# Patient Record
Sex: MALE | Race: WHITE | NOT HISPANIC OR LATINO | Employment: FULL TIME | URBAN - METROPOLITAN AREA
[De-identification: names, ages, dates, MRNs, and addresses within clinical notes are randomized per-mention and may not be internally consistent; named-entity substitution may affect disease eponyms.]

---

## 2023-10-04 PROBLEM — R01.1 HEART MURMUR: Status: ACTIVE | Noted: 2023-10-04

## 2023-10-04 PROBLEM — M72.2 PLANTAR FASCIITIS: Status: ACTIVE | Noted: 2023-10-04

## 2023-10-04 PROBLEM — K21.9 ESOPHAGEAL REFLUX: Status: ACTIVE | Noted: 2023-10-04

## 2023-10-04 PROBLEM — D22.5 MELANOCYTIC NEVI OF TRUNK: Status: ACTIVE | Noted: 2023-07-17

## 2023-10-04 PROBLEM — B35.1 TINEA UNGUIUM: Status: ACTIVE | Noted: 2023-07-17

## 2023-10-04 PROBLEM — E78.5 HYPERLIPIDEMIA: Status: ACTIVE | Noted: 2023-10-04

## 2023-10-04 RX ORDER — OMEPRAZOLE 40 MG/1
CAPSULE, DELAYED RELEASE ORAL
COMMUNITY
Start: 2022-11-30 | End: 2023-12-05 | Stop reason: ALTCHOICE

## 2023-10-04 RX ORDER — IBUPROFEN 200 MG
TABLET ORAL EVERY 8 HOURS
COMMUNITY
End: 2023-12-05 | Stop reason: ALTCHOICE

## 2023-10-04 RX ORDER — ACETAMINOPHEN 500 MG
TABLET ORAL EVERY 6 HOURS
COMMUNITY
End: 2023-12-05 | Stop reason: ALTCHOICE

## 2023-10-06 ENCOUNTER — OFFICE VISIT (OUTPATIENT)
Dept: ORTHOPEDIC SURGERY | Facility: CLINIC | Age: 63
End: 2023-10-06
Payer: COMMERCIAL

## 2023-10-06 ENCOUNTER — HOSPITAL ENCOUNTER (OUTPATIENT)
Dept: RADIOLOGY | Facility: CLINIC | Age: 63
Discharge: HOME | End: 2023-10-06
Payer: COMMERCIAL

## 2023-10-06 VITALS — BODY MASS INDEX: 27.44 KG/M2 | HEIGHT: 71 IN | WEIGHT: 196 LBS

## 2023-10-06 DIAGNOSIS — G89.29 CHRONIC PAIN OF LEFT KNEE: Primary | ICD-10-CM

## 2023-10-06 DIAGNOSIS — M23.52 CHRONIC INSTABILITY OF LEFT KNEE: ICD-10-CM

## 2023-10-06 DIAGNOSIS — G89.29 CHRONIC PAIN OF LEFT KNEE: ICD-10-CM

## 2023-10-06 DIAGNOSIS — M25.562 CHRONIC PAIN OF LEFT KNEE: ICD-10-CM

## 2023-10-06 DIAGNOSIS — M17.12 PRIMARY OSTEOARTHRITIS OF LEFT KNEE: ICD-10-CM

## 2023-10-06 DIAGNOSIS — M25.562 CHRONIC PAIN OF LEFT KNEE: Primary | ICD-10-CM

## 2023-10-06 PROCEDURE — 99214 OFFICE O/P EST MOD 30 MIN: CPT | Performed by: ORTHOPAEDIC SURGERY

## 2023-10-06 PROCEDURE — 73560 X-RAY EXAM OF KNEE 1 OR 2: CPT | Mod: LT,FY

## 2023-10-06 PROCEDURE — 1036F TOBACCO NON-USER: CPT | Performed by: ORTHOPAEDIC SURGERY

## 2023-10-06 RX ORDER — NAPROXEN 250 MG/1
250 TABLET ORAL 2 TIMES DAILY PRN
COMMUNITY
End: 2023-12-13 | Stop reason: HOSPADM

## 2023-10-06 RX ORDER — CEFAZOLIN SODIUM 2 G/100ML
2 INJECTION, SOLUTION INTRAVENOUS EVERY 8 HOURS
Status: CANCELLED | OUTPATIENT
Start: 2023-12-12

## 2023-10-06 ASSESSMENT — PAIN SCALES - GENERAL: PAINLEVEL_OUTOF10: 8

## 2023-10-06 ASSESSMENT — PAIN - FUNCTIONAL ASSESSMENT: PAIN_FUNCTIONAL_ASSESSMENT: 0-10

## 2023-10-06 ASSESSMENT — PAIN DESCRIPTION - DESCRIPTORS: DESCRIPTORS: SHARP;THROBBING

## 2023-10-06 NOTE — PROGRESS NOTES
Subjective      Chief Complaint   Patient presents with    Left Knee - Pain        No surgery found     HPI  This 63 year old patient presents today with  left knee pain. The patient states that this left knee pain has been present for years. The patient rates the knee pain as 9. The patient states that knee pain is worse with and activity and bearing weight. The patient states the knee is giving way and locking.The patient has tried ibuprofen and tylenol with no relief. Patient requests a discussion of further treatment options on examination today.     CARDIOLOGY:   Negative for chest pain, shortness of breath.   RESPIRATORY:   Negative for chest pain, shortness of breath.   MUSCULOSKELETAL:   See HPI for details.   NEUROLOGY:   Negative for tingling, numbness, weakness.    JOINT REPLACEMENT HISTORY    Primary joint affected: Left knee     Duration of symptoms: years    Joint replacement related history:  Osteoarthritis Severe  Avascular necrosis: No    Failed nonsurgical treatment:   NSAID/ COXIB: Yes  Weight loss: Yes  Physical therapy: Yes  Intra-articular injection: no  Braces, orthotics, or assistive devices: Yes    Radiological indications for replacement:   Subchondral cyst: No  Subchondral sclerosis: Yes  Periarticular osteophytes: No  Joint subluxation: Yes  Joint space narrowing: Yes    Highest level of walking support:   Cane, wheelchair or walker    Pain History:   Pain at rest: Severe  Pain when weigth bearing: Severe  Pain with passive ROM: Severe  Pain related ADL limitation: Severe  Abnormal findings on physical exam: Severe    Ability to walk without significant pain:  Household ambulator or less than 1 block    Is the patients current pain regimen controlling their joint pain? No     Objective    There were no vitals filed for this visit.    Knee Exam  Constitutional: Appears stated age. No apparent distress  Labored Breathing: No  Psychiatric: Normal mood and effect.   Neurological: alert and  oriented x3  Skin: intact  MUSCULOSKELETAL: Neck: No tenderness. No pain or limitation with range of motion. Back: No tenderness. Straight leg test negative bilaterally. left knee: There is diffuse tenderness over the knee. diminished range of motion from 5-110 degrees. McMurrays is positive. Anterior drawer and lachmans are negative. There is not an effusion present. The knee is not stable to valgus and varus stressing. The patient walks with a painful gait favoring the left knee while walking.    AP and lateral x-rays of the left knee done and read in office today show osteoarthritis of the left knee with loss of joint surface cartilage, sclerosis and spurring. There are anchors present from previous ACL repair.     Lorenzo was seen today for pain.  Diagnoses and all orders for this visit:  Chronic pain of left knee (Primary)  -     XR knee left 1-2 views; Future  Chronic instability of left knee  Primary osteoarthritis of left knee     Options are discussed with the patient in detail. The patient is instructed regarding ice, activity modification and risk for further injury with falling or trauma, and the use of a cane at all times for balance and support and continuing with physician directed at home gentle strengthening and range of motion exercises, and the appropriate use of Tylenol as needed for pain with its potential adverse reactions and side effects. The patient understands. The patient states that this left knee pain is debilitating. The patient states that since this left knee pain has recurred that it is disabling and impairs the patient´s ability to walk and do other activities that are important including participating in activities with family. AP and lateral x-rays show osteoarthritis of the left knee that is worse at the medial compartment with loss of joint surface cartilage, bone on bone and sclerosis. Left total knee replacement with indications, alternatives, potential risks including but not  limited to blood loss, blood clot, nerve damage, infection, death and stroke, benefits, unforseen risks, the rehab involved and the fact that no guarantee can be made were all discussed with the patient in detail. The patient understands, and wishes to proceed with left total knee replacement since his pain is disabling and impairs his ability to do activities that are important to him including exercises which she does to maintain his overall body health I agree. We will be setting this up at a time that is convenient for the patient and as the schedule allows. The patient is to follow up as needed. Please note that this report has been produced using speech recognition software.  It may contain errors related to grammar, punctuation or spelling.  Electronically signed, but not  Dutch Ramirez MD

## 2023-12-05 ENCOUNTER — LAB REQUISITION (OUTPATIENT)
Dept: LAB | Facility: HOSPITAL | Age: 63
End: 2023-12-05
Payer: COMMERCIAL

## 2023-12-05 ENCOUNTER — PRE-ADMISSION TESTING (OUTPATIENT)
Dept: PREADMISSION TESTING | Facility: HOSPITAL | Age: 63
End: 2023-12-05
Payer: COMMERCIAL

## 2023-12-05 VITALS
HEIGHT: 72 IN | TEMPERATURE: 96.6 F | HEART RATE: 54 BPM | DIASTOLIC BLOOD PRESSURE: 84 MMHG | WEIGHT: 197.53 LBS | RESPIRATION RATE: 16 BRPM | BODY MASS INDEX: 26.76 KG/M2 | SYSTOLIC BLOOD PRESSURE: 135 MMHG | OXYGEN SATURATION: 100 %

## 2023-12-05 DIAGNOSIS — M17.12 UNILATERAL PRIMARY OSTEOARTHRITIS, LEFT KNEE: ICD-10-CM

## 2023-12-05 DIAGNOSIS — M17.12 PRIMARY OSTEOARTHRITIS OF LEFT KNEE: ICD-10-CM

## 2023-12-05 DIAGNOSIS — Z01.818 PREOPERATIVE TESTING: Primary | ICD-10-CM

## 2023-12-05 LAB
ABO GROUP (TYPE) IN BLOOD: NORMAL
ANION GAP SERPL CALC-SCNC: 9 MMOL/L
ANTIBODY SCREEN: NORMAL
APPEARANCE UR: CLEAR
APTT PPP: 29.9 SECONDS (ref 22–32.5)
BILIRUB UR STRIP.AUTO-MCNC: NEGATIVE MG/DL
BUN SERPL-MCNC: 22 MG/DL (ref 8–25)
CALCIUM SERPL-MCNC: 9.7 MG/DL (ref 8.5–10.4)
CHLORIDE SERPL-SCNC: 103 MMOL/L (ref 97–107)
CO2 SERPL-SCNC: 27 MMOL/L (ref 24–31)
COLOR UR: NORMAL
CREAT SERPL-MCNC: 1.1 MG/DL (ref 0.4–1.6)
ERYTHROCYTE [DISTWIDTH] IN BLOOD BY AUTOMATED COUNT: 12.4 % (ref 11.5–14.5)
GFR SERPL CREATININE-BSD FRML MDRD: 76 ML/MIN/1.73M*2
GLUCOSE SERPL-MCNC: 93 MG/DL (ref 65–99)
GLUCOSE UR STRIP.AUTO-MCNC: NORMAL MG/DL
HCT VFR BLD AUTO: 40.2 % (ref 41–52)
HGB BLD-MCNC: 13.5 G/DL (ref 13.5–17.5)
INR PPP: 1.1 (ref 0.9–1.2)
KETONES UR STRIP.AUTO-MCNC: NEGATIVE MG/DL
LEUKOCYTE ESTERASE UR QL STRIP.AUTO: NEGATIVE
MCH RBC QN AUTO: 31 PG (ref 26–34)
MCHC RBC AUTO-ENTMCNC: 33.6 G/DL (ref 32–36)
MCV RBC AUTO: 92 FL (ref 80–100)
NITRITE UR QL STRIP.AUTO: NEGATIVE
NRBC BLD-RTO: 0 /100 WBCS (ref 0–0)
PH UR STRIP.AUTO: 5.5 [PH]
PLATELET # BLD AUTO: 216 X10*3/UL (ref 150–450)
POTASSIUM SERPL-SCNC: 4.7 MMOL/L (ref 3.4–5.1)
PROT UR STRIP.AUTO-MCNC: NEGATIVE MG/DL
PROTHROMBIN TIME: 11.4 SECONDS (ref 9.3–12.7)
RBC # BLD AUTO: 4.36 X10*6/UL (ref 4.5–5.9)
RBC # UR STRIP.AUTO: NEGATIVE /UL
RH FACTOR (ANTIGEN D): NORMAL
SODIUM SERPL-SCNC: 139 MMOL/L (ref 133–145)
SP GR UR STRIP.AUTO: 1.03
UROBILINOGEN UR STRIP.AUTO-MCNC: NORMAL MG/DL
WBC # BLD AUTO: 5.2 X10*3/UL (ref 4.4–11.3)

## 2023-12-05 PROCEDURE — 85027 COMPLETE CBC AUTOMATED: CPT

## 2023-12-05 PROCEDURE — 86901 BLOOD TYPING SEROLOGIC RH(D): CPT | Mod: OUT | Performed by: ORTHOPAEDIC SURGERY

## 2023-12-05 PROCEDURE — 93005 ELECTROCARDIOGRAM TRACING: CPT

## 2023-12-05 PROCEDURE — 87081 CULTURE SCREEN ONLY: CPT | Mod: TRILAB

## 2023-12-05 PROCEDURE — 82310 ASSAY OF CALCIUM: CPT

## 2023-12-05 PROCEDURE — 81003 URINALYSIS AUTO W/O SCOPE: CPT

## 2023-12-05 PROCEDURE — 85730 THROMBOPLASTIN TIME PARTIAL: CPT

## 2023-12-05 PROCEDURE — 36415 COLL VENOUS BLD VENIPUNCTURE: CPT

## 2023-12-05 PROCEDURE — 99214 OFFICE O/P EST MOD 30 MIN: CPT | Performed by: NURSE PRACTITIONER

## 2023-12-05 PROCEDURE — 93010 ELECTROCARDIOGRAM REPORT: CPT | Performed by: INTERNAL MEDICINE

## 2023-12-05 RX ORDER — CHLORHEXIDINE GLUCONATE ORAL RINSE 1.2 MG/ML
SOLUTION DENTAL
Qty: 473 ML | Refills: 0 | Status: SHIPPED | OUTPATIENT
Start: 2023-12-05 | End: 2023-12-13 | Stop reason: HOSPADM

## 2023-12-05 ASSESSMENT — ENCOUNTER SYMPTOMS
NEUROLOGICAL NEGATIVE: 1
GASTROINTESTINAL NEGATIVE: 1
RESPIRATORY NEGATIVE: 1
EYES NEGATIVE: 1
PSYCHIATRIC NEGATIVE: 1
ACTIVITY CHANGE: 1
ARTHRALGIAS: 1
ENDOCRINE NEGATIVE: 1
CARDIOVASCULAR NEGATIVE: 1

## 2023-12-05 ASSESSMENT — CHADS2 SCORE
HYPERTENSION: NO
CHF: NO
PRIOR STROKE OR TIA OR THROMBOEMBOLISM: NO
DIABETES: NO
CHADS2 SCORE: 0
AGE GREATER THAN OR EQUAL TO 75: NO

## 2023-12-05 ASSESSMENT — DUKE ACTIVITY SCORE INDEX (DASI)
CAN YOU DO MODERATE WORK AROUND THE HOUSE LIKE VACUUMING, SWEEPING FLOORS OR CARRYING GROCERIES: YES
CAN YOU WALK INDOORS, SUCH AS AROUND YOUR HOUSE: YES
CAN YOU DO HEAVY WORK AROUND THE HOUSE LIKE SCRUBBING FLOORS OR LIFTING AND MOVING HEAVY FURNITURE: YES
CAN YOU PARTICIPATE IN MODERATE RECREATIONAL ACTIVITIES LIKE GOLF, BOWLING, DANCING, DOUBLES TENNIS OR THROWING A BASEBALL OR FOOTBALL: YES
CAN YOU TAKE CARE OF YOURSELF (EAT, DRESS, BATHE, OR USE TOILET): YES
CAN YOU DO YARD WORK LIKE RAKING LEAVES, WEEDING OR PUSHING A MOWER: YES
CAN YOU DO LIGHT WORK AROUND THE HOUSE LIKE DUSTING OR WASHING DISHES: YES
CAN YOU RUN A SHORT DISTANCE: NO
DASI METS SCORE: 8
TOTAL_SCORE: 42.7
CAN YOU PARTICIPATE IN STRENOUS SPORTS LIKE SWIMMING, SINGLES TENNIS, FOOTBALL, BASKETBALL, OR SKIING: NO
CAN YOU CLIMB A FLIGHT OF STAIRS OR WALK UP A HILL: YES
CAN YOU WALK A BLOCK OR TWO ON LEVEL GROUND: YES
CAN YOU HAVE SEXUAL RELATIONS: YES

## 2023-12-05 ASSESSMENT — PAIN SCALES - GENERAL: PAINLEVEL_OUTOF10: 1

## 2023-12-05 ASSESSMENT — PAIN - FUNCTIONAL ASSESSMENT: PAIN_FUNCTIONAL_ASSESSMENT: 0-10

## 2023-12-05 ASSESSMENT — PAIN DESCRIPTION - DESCRIPTORS: DESCRIPTORS: ACHING

## 2023-12-05 NOTE — CPM/PAT H&P
CPM/PAT Evaluation       Name: Lornezo Peraza (Lorenzo Peraza)  /Age: 1960/63 y.o.     In-Person       Chief Complaint: Primary osteoarthritis of left knee    HPI  A 63-year-old male with primary osteoarthritis of the left knee.  History of progressive left knee pain over the past year that has become more severe in the last few months. Symptoms increase with ambulation, stairs, and transitioning from sitting to standing interfering with sleep and ADLs. Conservative treatments not helping.  He underwent successful right total knee arthroplasty 2022.  Endorses some radiating pain down left lower extremity. Denies chest pain, shortness of breath, syncope, history of MI, CVA, PE, DVT, fever, chills, or left lower extremity numbness.  He is scheduled for left total knee arthroplasty.    Past Medical History:   Diagnosis Date    Body mass index (BMI) 26.0-26.9, adult 2021    BMI 26.0-26.9,adult    Heart murmur     since age 16 no intervention    Hyperlipidemia     Osteoarthritis        Past Surgical History:   Procedure Laterality Date    COLONOSCOPY      x 2    CT HEAD ANGIO W AND WO IV CONTRAST  2021    CT HEAD ANGIO W AND WO IV CONTRAST 2021 [facility]    KNEE  2017    KNEE Bilateral     scope    KNEE ARTHROPLASTY Right 2023    KNEE SURGERY  2017    Knee Surgery    TONSILLECTOMY           No Known Allergies    Current Outpatient Medications   Medication Sig Dispense Refill    chlorhexidine (Peridex) 0.12 % solution Use cap to measure 15 mL.  Swish/gargle mouthwash for at least 30 seconds.  Do not swallow.  Use night before surgery after brushing teeth and morning of surgery after brushing teeth. 473 mL 0    naproxen (Naprosyn) 250 mg tablet Take 1 tablet (250 mg) by mouth 2 times a day as needed for mild pain (1 - 3).       No current facility-administered medications for this visit.     Review of Systems   Constitutional:  Positive for activity change.   HENT:  Negative.     Eyes: Negative.    Respiratory: Negative.     Cardiovascular: Negative.         H/o heart murmur diagnosed age 16   Gastrointestinal: Negative.    Endocrine: Negative.    Genitourinary: Negative.    Musculoskeletal:  Positive for arthralgias.        Left knee pain   Skin: Negative.    Neurological: Negative.    Psychiatric/Behavioral: Negative.          Physical Exam  Vitals reviewed.   HENT:      Head: Normocephalic and atraumatic.      Mouth/Throat:      Mouth: Mucous membranes are moist.   Eyes:      Pupils: Pupils are equal, round, and reactive to light.   Cardiovascular:      Rate and Rhythm: Normal rate and regular rhythm.      Heart sounds: Murmur (soft grade I-II/IV) heard.   Pulmonary:      Effort: Pulmonary effort is normal.      Breath sounds: Normal breath sounds.   Abdominal:      Palpations: Abdomen is soft.   Musculoskeletal:         General: Normal range of motion.      Cervical back: Normal range of motion.      Comments: Left knee pain, antalgic gait   Skin:     General: Skin is warm and dry.   Neurological:      Mental Status: He is alert and oriented to person, place, and time.   Psychiatric:         Mood and Affect: Mood normal.          PAT AIRWAY:   Airway:     Mallampati::  I    Neck ROM::  Full  normal        /84   Pulse 54   Temp 35.9 °C (96.6 °F) (Temporal)   Resp 16   Ht 1.829 m (6')   Wt 89.6 kg (197 lb 8.5 oz)   SpO2 100%   BMI 26.79 kg/m²         Stop Bang Score 2     CHADS 2 score: 1.9%  DASI score: 42.7  METS score: 8  Revised cardiac risk index: 0.9%  ASA II  ARISCAT 1.6%    Assessment and Plan:     Primary osteoarthritis of left knee Plan: Left total knee arthroplasty.  Hyperlipidemia  GERD  History of heart murmur since age 16-no interventions

## 2023-12-05 NOTE — PREPROCEDURE INSTRUCTIONS
Medication List            Accurate as of December 5, 2023 12:33 PM. Always use your most recent med list.                naproxen 250 mg tablet  Commonly known as: Naprosyn  Medication Adjustments for Surgery: Stop 7 days before surgery                 PAT DISCHARGE INSTRUCTIONS    Please call the Same Day Surgery (SDS) Department of the hospital where your procedure will be performed after 2:00 PM the day before your surgery. If you are scheduled on a Monday, or a Tuesday following a Monday holiday, you will need to call on the last business day prior to your surgery.    [facility]  [address] [place] [address], [address] [phone]    [facility]  [address] [place] [address] [phone]    [facility]  [address] [address]  [place] [id number] [phone]    Please let your surgeon know if:      You develop any open sores, shingles, burning or painful urination as these may increase your risk of an infection.   You no longer wish to have the surgery.   Any other personal circumstances change that may lead to the need to cancel or defer this surgery-such as being sick or getting admitted to any hospital within one week of your planned procedure.    Your contact details change, such as a change of address or phone number.    Starting now:     Please DO NOT drink alcohol or smoke for 24 hours before surgery. It is well known that quitting smoking can make a huge difference to your health and recovery from surgery. The longer you abstain from smoking, the better your chances of a healthy recovery. If you need help with quitting, call 5-800QUIT-NOW to be connected to a trained counselor who will discuss the best methods to help you quit.     Before your surgery:    Please stop all supplements 7 days prior to surgery. Or as directed by your surgeon.   Please stop taking NSAID pain medicine such as Advil and Motrin 7 days before surgery.    If you develop any  fever, cough, cold, rashes, cuts, scratches, scrapes, urinary symptoms or infection anywhere on your body (including teeth and gums) prior to surgery, please call your surgeon’s office as soon as possible. This may require treatment to reduce the chance of cancellation on the day of surgery.    The day before your surgery:   DIET- Do not eat any food after MIDNIGHT. May have 10 ounces of CLEAR LIQUIDS until TWO HOURS before your arrival time. This includes water, black tea or coffee (no milk ir cream), apple juice and electrolyte drinks (Gatorade). May chew gum until TWO hours before your surgery time.   Get a good night’s rest.  Use the special soap for bathing if you have been instructed to use one.    Scheduled surgery times may change and you will be notified if this occurs - please check your personal voicemail for any updates.     On the morning of surgery:   Wear comfortable, loose fitting clothes which open in the front. Please do not wear moisturizers, creams, lotions, makeup or perfume.    Please bring with you to surgery:   Photo ID and insurance card   Current list of medicines and allergies   Pacemaker/ Defibrillator/Heart stent cards   CPAP machine and mask    Slings/ splints/ crutches   A copy of your complete advanced directive/DHPOA.    Please do NOT bring with you to surgery:   All jewelry and valuables should be left at home.   Prosthetic devices such as contact lenses, hearing aids, dentures, eyelash extensions, hairpins and body piercings must be removed prior to going in to the surgical suite.    After outpatient surgery:   A responsible adult MUST accompany you at the time of discharge and stay with you for 24 hours after your surgery. You may NOT drive yourself home after surgery.    Do not drive, operate machinery, make critical decisions or do activities that require co-ordination or balance until after a night’s sleep.   Do not drink alcoholic beverages for 24 hours.   Instructions for  resuming your medications will be provided by your surgeon.    CALL YOUR DOCTOR AFTER SURGERY IF YOU HAVE:     Chills and/or a fever of 101° F or higher.    Redness, swelling, pus or drainage from your surgical wound or a bad smell from the wound.    Lightheadedness, fainting or confusion.    Persistent vomiting (throwing up) and are not able to eat or drink for 12 hours.    Three or more loose, watery bowel movements in 24 hours (diarrhea).   Difficulty or pain while urinating( after non-urological surgery)    Pain and swelling in your legs, especially if it is only on one side.    Difficulty breathing or are breathing faster than normal.    Any new concerning symptoms.     Home Preoperative Antibacterial Shower    What is a home preoperative antibacterial shower?  This shower is a way of cleaning the skin with a germ killing solution before surgery. The solution contains chlorhexidine, commonly known as CHG. CHG is a skin cleanser with germ killing ability. Let your doctor know if you are allergic to chlorhexidine.      Why do I need to take a preoperative antibacterial shower?  Skin is not sterile. It is best to try to make your skin as free of germs as possible before surgery. Proper cleansing with a germ killing soap before surgery can lower the number of germs on your skin. This helps to reduce the risk of infection at the surgical site. Following the instructions listed below will help you prepare your skin for surgery.    How do I use the solution?      Steps: Begin using your CHG soap FIVE DAYS BEFORE your scheduled surgery on __________________________________________________.  First, wash and rinse your hair using the CHG soap. Keep CHG away soap away from ear canals and eyes.  Rinse completely, do not condition. Hair extensions should be removed.  Wash your face with your normal soap and rinse.  Apply the CHG solution to a clean wet washcloth. Turn the water off or move away from the water spray to avoid  premature rinsing of the CHG soap as you are applying.  Firmly lather your entire body from neck down. Do not use on face.  Pay special attention to the areas(s) where your incision(s) will be located unless they are on your face.  Avoid scrubbing your skin too hard.  The important point is to have the CHG soap sit on your skin for 3 minutes.  DO NOT wash with regular soap after you have used the CHG soap solution.  Pat yourself dry with a clean, freshly laundered towel.  DO NOT apply powders, deodorants or lotions.  Dress in clean, freshly laundered night clothes.  Be sure to sleep with clean, freshly laundered sheets.  Be aware that CHG will cause stains on fabrics; if you wash them with bleach after use. Rinse your washcloth and other linens that have contact with CHG completely. Use only non-chlorine detergents to launder the items used.  The morning of surgery is the fifth day. Repeat the above steps and dress in clean comfortable clothing.      Who should I call if I have any questions regarding the use of CHG soap?  Call the [facility] at [phone] if you have any questions.     Patient Information: Oral/Dental Rinse    What is oral/dental rinse?  It is a mouthwash. It is a way of cleaning the mouth with a germ killing solution before your surgery. The solution contains chlorhexidine, commonly know as CHG.  It is used inside the mouth to kill bacteria known as Staphylococcus aureus.  Let your doctor know if you are allergic to chlorhexidine.    Why do I need to use CHG oral/dental rinse?  The CHG oral/dental rinse helps to kill bacteria in your mouth known as Staphylococcus aureus. This reduces the risk of infection at the surgical site.    Using your CHG oral/dental rinse.  STEPS: use your CHG oral/dental rinse after you brush your teeth the night before (at bedtime) and the morning of your surgery. Follow the directions on your  prescription label.  Use the cap on the container to measure 15ml (fill cap to fill line)  Swish (gargle if you can) the mouthwash in your mouth for at least 30 seconds, (do not swallow) spit out.  After you use your CHG rinse, do not rinse your mouth with water, drink or eat. Please refer to prescription label for the appropriate time to resume oral intake.    What side effects might I have using the CHG oral/dental rinse?  CHG rinse will stick to the plaque on the teeth. Brush and floss just before use. Teeth brushing will help avoid staining of plaque during use.    Who should I contact if I have questions about the CHG oral/dental rinse?  Please call [facility], [facility] at [phone] if you have any questions.

## 2023-12-05 NOTE — H&P (VIEW-ONLY)
CPM/PAT Evaluation       Name: Lorenzo Peraza (Lorenzo Peraza)  /Age: 1960/63 y.o.     In-Person       Chief Complaint: Primary osteoarthritis of left knee    HPI  A 63-year-old male with primary osteoarthritis of the left knee.  History of progressive left knee pain over the past year that has become more severe in the last few months. Symptoms increase with ambulation, stairs, and transitioning from sitting to standing interfering with sleep and ADLs. Conservative treatments not helping.  He underwent successful right total knee arthroplasty 2022.  Endorses some radiating pain down left lower extremity. Denies chest pain, shortness of breath, syncope, history of MI, CVA, PE, DVT, fever, chills, or left lower extremity numbness.  He is scheduled for left total knee arthroplasty.    Past Medical History:   Diagnosis Date    Body mass index (BMI) 26.0-26.9, adult 2021    BMI 26.0-26.9,adult    Heart murmur     since age 16 no intervention    Hyperlipidemia     Osteoarthritis        Past Surgical History:   Procedure Laterality Date    COLONOSCOPY      x 2    CT HEAD ANGIO W AND WO IV CONTRAST  2021    CT HEAD ANGIO W AND WO IV CONTRAST 2021 [facility]    KNEE  2017    KNEE Bilateral     scope    KNEE ARTHROPLASTY Right 2023    KNEE SURGERY  2017    Knee Surgery    TONSILLECTOMY           No Known Allergies    Current Outpatient Medications   Medication Sig Dispense Refill    chlorhexidine (Peridex) 0.12 % solution Use cap to measure 15 mL.  Swish/gargle mouthwash for at least 30 seconds.  Do not swallow.  Use night before surgery after brushing teeth and morning of surgery after brushing teeth. 473 mL 0    naproxen (Naprosyn) 250 mg tablet Take 1 tablet (250 mg) by mouth 2 times a day as needed for mild pain (1 - 3).       No current facility-administered medications for this visit.     Review of Systems   Constitutional:  Positive for activity change.   HENT:  Negative.     Eyes: Negative.    Respiratory: Negative.     Cardiovascular: Negative.         H/o heart murmur diagnosed age 16   Gastrointestinal: Negative.    Endocrine: Negative.    Genitourinary: Negative.    Musculoskeletal:  Positive for arthralgias.        Left knee pain   Skin: Negative.    Neurological: Negative.    Psychiatric/Behavioral: Negative.          Physical Exam  Vitals reviewed.   HENT:      Head: Normocephalic and atraumatic.      Mouth/Throat:      Mouth: Mucous membranes are moist.   Eyes:      Pupils: Pupils are equal, round, and reactive to light.   Cardiovascular:      Rate and Rhythm: Normal rate and regular rhythm.      Heart sounds: Murmur (soft grade I-II/IV) heard.   Pulmonary:      Effort: Pulmonary effort is normal.      Breath sounds: Normal breath sounds.   Abdominal:      Palpations: Abdomen is soft.   Musculoskeletal:         General: Normal range of motion.      Cervical back: Normal range of motion.      Comments: Left knee pain, antalgic gait   Skin:     General: Skin is warm and dry.   Neurological:      Mental Status: He is alert and oriented to person, place, and time.   Psychiatric:         Mood and Affect: Mood normal.          PAT AIRWAY:   Airway:     Mallampati::  I    Neck ROM::  Full  normal        /84   Pulse 54   Temp 35.9 °C (96.6 °F) (Temporal)   Resp 16   Ht 1.829 m (6')   Wt 89.6 kg (197 lb 8.5 oz)   SpO2 100%   BMI 26.79 kg/m²         Stop Bang Score 2     CHADS 2 score: 1.9%  DASI score: 42.7  METS score: 8  Revised cardiac risk index: 0.9%  ASA II  ARISCAT 1.6%    Assessment and Plan:     Primary osteoarthritis of left knee Plan: Left total knee arthroplasty.  Hyperlipidemia  GERD  History of heart murmur since age 16-no interventions

## 2023-12-06 LAB
ATRIAL RATE: 51 BPM
P AXIS: 75 DEGREES
P OFFSET: 181 MS
P ONSET: 116 MS
PR INTERVAL: 218 MS
Q ONSET: 225 MS
QRS COUNT: 9 BEATS
QRS DURATION: 78 MS
QT INTERVAL: 444 MS
QTC CALCULATION(BAZETT): 409 MS
QTC FREDERICIA: 421 MS
R AXIS: 5 DEGREES
T AXIS: 29 DEGREES
T OFFSET: 447 MS
VENTRICULAR RATE: 51 BPM

## 2023-12-07 LAB — STAPHYLOCOCCUS SPEC CULT: ABNORMAL

## 2023-12-12 ENCOUNTER — HOSPITAL ENCOUNTER (OUTPATIENT)
Facility: HOSPITAL | Age: 63
Setting detail: OBSERVATION
Discharge: HOME | End: 2023-12-13
Attending: ORTHOPAEDIC SURGERY | Admitting: ORTHOPAEDIC SURGERY
Payer: COMMERCIAL

## 2023-12-12 ENCOUNTER — ANESTHESIA EVENT (OUTPATIENT)
Dept: OPERATING ROOM | Facility: HOSPITAL | Age: 63
End: 2023-12-12
Payer: COMMERCIAL

## 2023-12-12 ENCOUNTER — ANESTHESIA (OUTPATIENT)
Dept: OPERATING ROOM | Facility: HOSPITAL | Age: 63
End: 2023-12-12
Payer: COMMERCIAL

## 2023-12-12 ENCOUNTER — APPOINTMENT (OUTPATIENT)
Dept: RADIOLOGY | Facility: HOSPITAL | Age: 63
End: 2023-12-12
Payer: COMMERCIAL

## 2023-12-12 DIAGNOSIS — M17.12 PRIMARY OSTEOARTHRITIS OF LEFT KNEE: ICD-10-CM

## 2023-12-12 PROBLEM — Z98.890 HISTORY OF GENERAL ANESTHESIA: Status: ACTIVE | Noted: 2023-12-12

## 2023-12-12 LAB
ABO GROUP (TYPE) IN BLOOD: NORMAL
RH FACTOR (ANTIGEN D): NORMAL

## 2023-12-12 PROCEDURE — 64447 NJX AA&/STRD FEMORAL NRV IMG: CPT | Performed by: ANESTHESIOLOGY

## 2023-12-12 PROCEDURE — A27447 PR TOTAL KNEE ARTHROPLASTY: Performed by: NURSE ANESTHETIST, CERTIFIED REGISTERED

## 2023-12-12 PROCEDURE — A4649 SURGICAL SUPPLIES: HCPCS | Performed by: ORTHOPAEDIC SURGERY

## 2023-12-12 PROCEDURE — 3700000001 HC GENERAL ANESTHESIA TIME - INITIAL BASE CHARGE: Performed by: ORTHOPAEDIC SURGERY

## 2023-12-12 PROCEDURE — 3700000002 HC GENERAL ANESTHESIA TIME - EACH INCREMENTAL 1 MINUTE: Performed by: ORTHOPAEDIC SURGERY

## 2023-12-12 PROCEDURE — G0378 HOSPITAL OBSERVATION PER HR: HCPCS

## 2023-12-12 PROCEDURE — 2500000004 HC RX 250 GENERAL PHARMACY W/ HCPCS (ALT 636 FOR OP/ED): Performed by: ANESTHESIOLOGY

## 2023-12-12 PROCEDURE — C1776 JOINT DEVICE (IMPLANTABLE): HCPCS | Performed by: ORTHOPAEDIC SURGERY

## 2023-12-12 PROCEDURE — 96365 THER/PROPH/DIAG IV INF INIT: CPT | Mod: 59 | Performed by: ORTHOPAEDIC SURGERY

## 2023-12-12 PROCEDURE — A27447 PR TOTAL KNEE ARTHROPLASTY: Performed by: ANESTHESIOLOGY

## 2023-12-12 PROCEDURE — 2500000005 HC RX 250 GENERAL PHARMACY W/O HCPCS: Performed by: NURSE ANESTHETIST, CERTIFIED REGISTERED

## 2023-12-12 PROCEDURE — 7100000001 HC RECOVERY ROOM TIME - INITIAL BASE CHARGE: Performed by: ORTHOPAEDIC SURGERY

## 2023-12-12 PROCEDURE — 36415 COLL VENOUS BLD VENIPUNCTURE: CPT | Performed by: ORTHOPAEDIC SURGERY

## 2023-12-12 PROCEDURE — 2780000003 HC OR 278 NO HCPCS: Performed by: ORTHOPAEDIC SURGERY

## 2023-12-12 PROCEDURE — 73560 X-RAY EXAM OF KNEE 1 OR 2: CPT | Mod: LT,FY

## 2023-12-12 PROCEDURE — 2500000001 HC RX 250 WO HCPCS SELF ADMINISTERED DRUGS (ALT 637 FOR MEDICARE OP): Performed by: ORTHOPAEDIC SURGERY

## 2023-12-12 PROCEDURE — 2500000004 HC RX 250 GENERAL PHARMACY W/ HCPCS (ALT 636 FOR OP/ED): Performed by: ORTHOPAEDIC SURGERY

## 2023-12-12 PROCEDURE — 2500000004 HC RX 250 GENERAL PHARMACY W/ HCPCS (ALT 636 FOR OP/ED): Performed by: NURSE ANESTHETIST, CERTIFIED REGISTERED

## 2023-12-12 PROCEDURE — 27447 TOTAL KNEE ARTHROPLASTY: CPT | Performed by: PHYSICIAN ASSISTANT

## 2023-12-12 PROCEDURE — 2720000007 HC OR 272 NO HCPCS: Performed by: ORTHOPAEDIC SURGERY

## 2023-12-12 PROCEDURE — 27447 TOTAL KNEE ARTHROPLASTY: CPT | Performed by: ORTHOPAEDIC SURGERY

## 2023-12-12 PROCEDURE — 3600000018 HC OR TIME - INITIAL BASE CHARGE - PROCEDURE LEVEL SIX: Performed by: ORTHOPAEDIC SURGERY

## 2023-12-12 PROCEDURE — 3600000017 HC OR TIME - EACH INCREMENTAL 1 MINUTE - PROCEDURE LEVEL SIX: Performed by: ORTHOPAEDIC SURGERY

## 2023-12-12 PROCEDURE — 7100000002 HC RECOVERY ROOM TIME - EACH INCREMENTAL 1 MINUTE: Performed by: ORTHOPAEDIC SURGERY

## 2023-12-12 DEVICE — ATTUNE KNEE SYSTEM TIBIAL INSERT FIXED BEARING POSTERIOR STABILIZED 7 7MM AOX
Type: IMPLANTABLE DEVICE | Site: KNEE | Status: FUNCTIONAL
Brand: ATTUNE

## 2023-12-12 DEVICE — ATTUNE PATELLA MEDIALIZED DOME 41MM CEMENTED AOX
Type: IMPLANTABLE DEVICE | Site: KNEE | Status: FUNCTIONAL
Brand: ATTUNE

## 2023-12-12 DEVICE — SIMPLEX® HV IS A FAST-SETTING ACRYLIC RESIN FOR USE IN BONE SURGERY. MIXING THE TWO SEPARATE STERILE COMPONENTS PRODUCES A DUCTILE BONE CEMENT WHICH, AFTER HARDENING, FIXES THE IMPLANT AND TRANSFERS STRESSES PRODUCED DURING MOVEMENT EVENLY TO THE BONE. SIMPLEX® HV CEMENT POWDER ALSO CONTAINS INSOLUBLE ZIRCONIUM DIOXIDE AS AN X-RAY CONTRAST MEDIUM. SIMPLEX® HV DOES NOT EMIT A SIGNAL AND DOES NOT POSE A SAFETY RISK IN A MAGNETIC RESONANCE ENVIRONMENT.
Type: IMPLANTABLE DEVICE | Site: KNEE | Status: FUNCTIONAL
Brand: SIMPLEX HV

## 2023-12-12 DEVICE — ATTUNE KNEE SYSTEM TIBIAL BASE FIXED BEARING SIZE 8 CEMENTED
Type: IMPLANTABLE DEVICE | Site: KNEE | Status: FUNCTIONAL
Brand: ATTUNE

## 2023-12-12 DEVICE — ATTUNE KNEE SYSTEM FEMORAL POSTERIOR STABILIZED SIZE 7 LEFT CEMENTED
Type: IMPLANTABLE DEVICE | Site: KNEE | Status: FUNCTIONAL
Brand: ATTUNE

## 2023-12-12 RX ORDER — FENTANYL CITRATE 50 UG/ML
50 INJECTION, SOLUTION INTRAMUSCULAR; INTRAVENOUS ONCE
Status: COMPLETED | OUTPATIENT
Start: 2023-12-12 | End: 2023-12-12

## 2023-12-12 RX ORDER — MIDAZOLAM HYDROCHLORIDE 1 MG/ML
2 INJECTION, SOLUTION INTRAMUSCULAR; INTRAVENOUS ONCE
Status: COMPLETED | OUTPATIENT
Start: 2023-12-12 | End: 2023-12-12

## 2023-12-12 RX ORDER — HYDRALAZINE HYDROCHLORIDE 20 MG/ML
5 INJECTION INTRAMUSCULAR; INTRAVENOUS EVERY 30 MIN PRN
Status: DISCONTINUED | OUTPATIENT
Start: 2023-12-12 | End: 2023-12-12 | Stop reason: HOSPADM

## 2023-12-12 RX ORDER — LIDOCAINE HYDROCHLORIDE 20 MG/ML
INJECTION, SOLUTION INFILTRATION; PERINEURAL AS NEEDED
Status: DISCONTINUED | OUTPATIENT
Start: 2023-12-12 | End: 2023-12-12

## 2023-12-12 RX ORDER — TRANEXAMIC ACID 100 MG/ML
INJECTION, SOLUTION INTRAVENOUS AS NEEDED
Status: DISCONTINUED | OUTPATIENT
Start: 2023-12-12 | End: 2023-12-12

## 2023-12-12 RX ORDER — ONDANSETRON HYDROCHLORIDE 2 MG/ML
INJECTION, SOLUTION INTRAVENOUS AS NEEDED
Status: DISCONTINUED | OUTPATIENT
Start: 2023-12-12 | End: 2023-12-12

## 2023-12-12 RX ORDER — CEFAZOLIN SODIUM 2 G/100ML
2 INJECTION, SOLUTION INTRAVENOUS EVERY 8 HOURS
Status: COMPLETED | OUTPATIENT
Start: 2023-12-12 | End: 2023-12-13

## 2023-12-12 RX ORDER — ACETAMINOPHEN 325 MG/1
650 TABLET ORAL EVERY 6 HOURS SCHEDULED
Status: DISCONTINUED | OUTPATIENT
Start: 2023-12-12 | End: 2023-12-13 | Stop reason: HOSPADM

## 2023-12-12 RX ORDER — OXYCODONE HYDROCHLORIDE 5 MG/1
5 TABLET ORAL EVERY 4 HOURS PRN
Status: DISCONTINUED | OUTPATIENT
Start: 2023-12-12 | End: 2023-12-12 | Stop reason: HOSPADM

## 2023-12-12 RX ORDER — ACETAMINOPHEN 325 MG/1
650 TABLET ORAL EVERY 4 HOURS PRN
Status: DISCONTINUED | OUTPATIENT
Start: 2023-12-12 | End: 2023-12-13 | Stop reason: HOSPADM

## 2023-12-12 RX ORDER — MORPHINE SULFATE 2 MG/ML
1 INJECTION, SOLUTION INTRAMUSCULAR; INTRAVENOUS EVERY 4 HOURS PRN
Status: DISCONTINUED | OUTPATIENT
Start: 2023-12-12 | End: 2023-12-13 | Stop reason: HOSPADM

## 2023-12-12 RX ORDER — FENTANYL CITRATE 50 UG/ML
25 INJECTION, SOLUTION INTRAMUSCULAR; INTRAVENOUS EVERY 5 MIN PRN
Status: DISCONTINUED | OUTPATIENT
Start: 2023-12-12 | End: 2023-12-12 | Stop reason: HOSPADM

## 2023-12-12 RX ORDER — CEFAZOLIN SODIUM 2 G/100ML
2 INJECTION, SOLUTION INTRAVENOUS EVERY 8 HOURS
Status: DISCONTINUED | OUTPATIENT
Start: 2023-12-12 | End: 2023-12-12 | Stop reason: HOSPADM

## 2023-12-12 RX ORDER — ONDANSETRON HYDROCHLORIDE 2 MG/ML
4 INJECTION, SOLUTION INTRAVENOUS ONCE AS NEEDED
Status: DISCONTINUED | OUTPATIENT
Start: 2023-12-12 | End: 2023-12-12 | Stop reason: HOSPADM

## 2023-12-12 RX ORDER — ONDANSETRON HYDROCHLORIDE 2 MG/ML
4 INJECTION, SOLUTION INTRAVENOUS EVERY 8 HOURS PRN
Status: DISCONTINUED | OUTPATIENT
Start: 2023-12-12 | End: 2023-12-13 | Stop reason: HOSPADM

## 2023-12-12 RX ORDER — FERROUS SULFATE 325(65) MG
2 TABLET ORAL
Status: DISCONTINUED | OUTPATIENT
Start: 2023-12-13 | End: 2023-12-13 | Stop reason: HOSPADM

## 2023-12-12 RX ORDER — ONDANSETRON 4 MG/1
4 TABLET, FILM COATED ORAL EVERY 8 HOURS PRN
Status: DISCONTINUED | OUTPATIENT
Start: 2023-12-12 | End: 2023-12-13 | Stop reason: HOSPADM

## 2023-12-12 RX ORDER — PROPOFOL 10 MG/ML
INJECTION, EMULSION INTRAVENOUS AS NEEDED
Status: DISCONTINUED | OUTPATIENT
Start: 2023-12-12 | End: 2023-12-12

## 2023-12-12 RX ORDER — HYDROCODONE BITARTRATE AND ACETAMINOPHEN 5; 325 MG/1; MG/1
1 TABLET ORAL EVERY 4 HOURS PRN
Status: DISCONTINUED | OUTPATIENT
Start: 2023-12-12 | End: 2023-12-13 | Stop reason: HOSPADM

## 2023-12-12 RX ORDER — MIDAZOLAM HYDROCHLORIDE 1 MG/ML
INJECTION, SOLUTION INTRAMUSCULAR; INTRAVENOUS AS NEEDED
Status: DISCONTINUED | OUTPATIENT
Start: 2023-12-12 | End: 2023-12-12

## 2023-12-12 RX ORDER — VANCOMYCIN HYDROCHLORIDE 1 G/20ML
INJECTION, POWDER, LYOPHILIZED, FOR SOLUTION INTRAVENOUS AS NEEDED
Status: DISCONTINUED | OUTPATIENT
Start: 2023-12-12 | End: 2023-12-12 | Stop reason: HOSPADM

## 2023-12-12 RX ORDER — ALBUTEROL SULFATE 0.83 MG/ML
2.5 SOLUTION RESPIRATORY (INHALATION) ONCE AS NEEDED
Status: DISCONTINUED | OUTPATIENT
Start: 2023-12-12 | End: 2023-12-12 | Stop reason: HOSPADM

## 2023-12-12 RX ORDER — POLYETHYLENE GLYCOL 3350 17 G/17G
17 POWDER, FOR SOLUTION ORAL DAILY
Status: DISCONTINUED | OUTPATIENT
Start: 2023-12-12 | End: 2023-12-13 | Stop reason: HOSPADM

## 2023-12-12 RX ORDER — DIPHENHYDRAMINE HYDROCHLORIDE 50 MG/ML
12.5 INJECTION INTRAMUSCULAR; INTRAVENOUS ONCE AS NEEDED
Status: DISCONTINUED | OUTPATIENT
Start: 2023-12-12 | End: 2023-12-12 | Stop reason: HOSPADM

## 2023-12-12 RX ORDER — IPRATROPIUM BROMIDE 0.5 MG/2.5ML
500 SOLUTION RESPIRATORY (INHALATION) ONCE
Status: DISCONTINUED | OUTPATIENT
Start: 2023-12-12 | End: 2023-12-12 | Stop reason: HOSPADM

## 2023-12-12 RX ORDER — DEXAMETHASONE SODIUM PHOSPHATE 4 MG/ML
INJECTION, SOLUTION INTRA-ARTICULAR; INTRALESIONAL; INTRAMUSCULAR; INTRAVENOUS; SOFT TISSUE AS NEEDED
Status: DISCONTINUED | OUTPATIENT
Start: 2023-12-12 | End: 2023-12-12

## 2023-12-12 RX ADMIN — CEFAZOLIN SODIUM 2 G: 2 INJECTION, SOLUTION INTRAVENOUS at 23:24

## 2023-12-12 RX ADMIN — DEXAMETHASONE SODIUM PHOSPHATE 4 MG: 4 INJECTION, SOLUTION INTRA-ARTICULAR; INTRALESIONAL; INTRAMUSCULAR; INTRAVENOUS; SOFT TISSUE at 08:14

## 2023-12-12 RX ADMIN — ACETAMINOPHEN 650 MG: 325 TABLET ORAL at 19:41

## 2023-12-12 RX ADMIN — ACETAMINOPHEN 650 MG: 325 TABLET ORAL at 23:25

## 2023-12-12 RX ADMIN — PROPOFOL 200 MG: 10 INJECTION, EMULSION INTRAVENOUS at 07:55

## 2023-12-12 RX ADMIN — HYDROMORPHONE HYDROCHLORIDE 0.2 MG: 0.2 INJECTION, SOLUTION INTRAMUSCULAR; INTRAVENOUS; SUBCUTANEOUS at 11:15

## 2023-12-12 RX ADMIN — MIDAZOLAM 2 MG: 1 INJECTION INTRAMUSCULAR; INTRAVENOUS at 07:57

## 2023-12-12 RX ADMIN — FENTANYL CITRATE 50 MCG: 0.05 INJECTION, SOLUTION INTRAMUSCULAR; INTRAVENOUS at 07:22

## 2023-12-12 RX ADMIN — HYDROMORPHONE HYDROCHLORIDE 0.2 MG: 0.2 INJECTION, SOLUTION INTRAMUSCULAR; INTRAVENOUS; SUBCUTANEOUS at 11:22

## 2023-12-12 RX ADMIN — MEPERIDINE HYDROCHLORIDE 12.5 MG: 25 INJECTION, SOLUTION INTRAMUSCULAR; INTRAVENOUS; SUBCUTANEOUS at 10:54

## 2023-12-12 RX ADMIN — HYDROMORPHONE HYDROCHLORIDE 0.2 MG: 0.2 INJECTION, SOLUTION INTRAMUSCULAR; INTRAVENOUS; SUBCUTANEOUS at 11:02

## 2023-12-12 RX ADMIN — CEFAZOLIN SODIUM 2 G: 2 INJECTION, SOLUTION INTRAVENOUS at 15:30

## 2023-12-12 RX ADMIN — HYDROCODONE BITARTRATE AND ACETAMINOPHEN 1 TABLET: 5; 325 TABLET ORAL at 19:44

## 2023-12-12 RX ADMIN — HYDROCODONE BITARTRATE AND ACETAMINOPHEN 1 TABLET: 5; 325 TABLET ORAL at 23:45

## 2023-12-12 RX ADMIN — HYDROMORPHONE HYDROCHLORIDE 0.5 MG: 1 INJECTION, SOLUTION INTRAMUSCULAR; INTRAVENOUS; SUBCUTANEOUS at 11:31

## 2023-12-12 RX ADMIN — TRANEXAMIC ACID 1000 MG: 100 INJECTION, SOLUTION INTRAVENOUS at 09:53

## 2023-12-12 RX ADMIN — LIDOCAINE HYDROCHLORIDE 100 MG: 20 INJECTION, SOLUTION INFILTRATION; PERINEURAL at 07:56

## 2023-12-12 RX ADMIN — CEFAZOLIN SODIUM 2 G: 2 INJECTION, SOLUTION INTRAVENOUS at 08:06

## 2023-12-12 RX ADMIN — TRANEXAMIC ACID 1000 MG: 100 INJECTION, SOLUTION INTRAVENOUS at 07:55

## 2023-12-12 RX ADMIN — SODIUM CHLORIDE, SODIUM LACTATE, POTASSIUM CHLORIDE, AND CALCIUM CHLORIDE: 600; 310; 30; 20 INJECTION, SOLUTION INTRAVENOUS at 07:50

## 2023-12-12 RX ADMIN — ONDANSETRON HYDROCHLORIDE 4 MG: 2 INJECTION INTRAMUSCULAR; INTRAVENOUS at 08:14

## 2023-12-12 RX ADMIN — HYDROMORPHONE HYDROCHLORIDE 0.2 MG: 0.2 INJECTION, SOLUTION INTRAMUSCULAR; INTRAVENOUS; SUBCUTANEOUS at 11:08

## 2023-12-12 RX ADMIN — MIDAZOLAM HYDROCHLORIDE 2 MG: 1 INJECTION, SOLUTION INTRAMUSCULAR; INTRAVENOUS at 07:23

## 2023-12-12 SDOH — SOCIAL STABILITY: SOCIAL INSECURITY: DOES ANYONE TRY TO KEEP YOU FROM HAVING/CONTACTING OTHER FRIENDS OR DOING THINGS OUTSIDE YOUR HOME?: NO

## 2023-12-12 SDOH — SOCIAL STABILITY: SOCIAL INSECURITY: ABUSE: ADULT

## 2023-12-12 SDOH — SOCIAL STABILITY: SOCIAL INSECURITY: WERE YOU ABLE TO COMPLETE ALL THE BEHAVIORAL HEALTH SCREENINGS?: YES

## 2023-12-12 SDOH — SOCIAL STABILITY: SOCIAL INSECURITY: ARE THERE ANY APPARENT SIGNS OF INJURIES/BEHAVIORS THAT COULD BE RELATED TO ABUSE/NEGLECT?: NO

## 2023-12-12 SDOH — SOCIAL STABILITY: SOCIAL INSECURITY: DO YOU FEEL UNSAFE GOING BACK TO THE PLACE WHERE YOU ARE LIVING?: NO

## 2023-12-12 SDOH — SOCIAL STABILITY: SOCIAL INSECURITY: ARE YOU OR HAVE YOU BEEN THREATENED OR ABUSED PHYSICALLY, EMOTIONALLY, OR SEXUALLY BY ANYONE?: NO

## 2023-12-12 SDOH — HEALTH STABILITY: MENTAL HEALTH: CURRENT SMOKER: 0

## 2023-12-12 SDOH — SOCIAL STABILITY: SOCIAL INSECURITY: DO YOU FEEL ANYONE HAS EXPLOITED OR TAKEN ADVANTAGE OF YOU FINANCIALLY OR OF YOUR PERSONAL PROPERTY?: NO

## 2023-12-12 SDOH — SOCIAL STABILITY: SOCIAL INSECURITY: HAS ANYONE EVER THREATENED TO HURT YOUR FAMILY OR YOUR PETS?: NO

## 2023-12-12 SDOH — SOCIAL STABILITY: SOCIAL INSECURITY: HAVE YOU HAD THOUGHTS OF HARMING ANYONE ELSE?: NO

## 2023-12-12 ASSESSMENT — PAIN DESCRIPTION - ORIENTATION: ORIENTATION: LEFT

## 2023-12-12 ASSESSMENT — PAIN SCALES - GENERAL
PAINLEVEL_OUTOF10: 4
PAINLEVEL_OUTOF10: 6
PAINLEVEL_OUTOF10: 6
PAINLEVEL_OUTOF10: 5 - MODERATE PAIN
PAINLEVEL_OUTOF10: 4
PAIN_LEVEL: 2
PAINLEVEL_OUTOF10: 10 - WORST POSSIBLE PAIN
PAINLEVEL_OUTOF10: 10 - WORST POSSIBLE PAIN
PAINLEVEL_OUTOF10: 6
PAINLEVEL_OUTOF10: 2
PAINLEVEL_OUTOF10: 8
PAINLEVEL_OUTOF10: 10 - WORST POSSIBLE PAIN
PAINLEVEL_OUTOF10: 1
PAINLEVEL_OUTOF10: 10 - WORST POSSIBLE PAIN
PAINLEVEL_OUTOF10: 4

## 2023-12-12 ASSESSMENT — COGNITIVE AND FUNCTIONAL STATUS - GENERAL
HELP NEEDED FOR BATHING: A LITTLE
DAILY ACTIVITIY SCORE: 22
STANDING UP FROM CHAIR USING ARMS: A LITTLE
WALKING IN HOSPITAL ROOM: A LITTLE
MOBILITY SCORE: 20
MOVING TO AND FROM BED TO CHAIR: A LITTLE
CLIMB 3 TO 5 STEPS WITH RAILING: A LITTLE
DRESSING REGULAR LOWER BODY CLOTHING: A LITTLE
PATIENT BASELINE BEDBOUND: NO

## 2023-12-12 ASSESSMENT — PAIN - FUNCTIONAL ASSESSMENT
PAIN_FUNCTIONAL_ASSESSMENT: 0-10
PAIN_FUNCTIONAL_ASSESSMENT: FLACC (FACE, LEGS, ACTIVITY, CRY, CONSOLABILITY)
PAIN_FUNCTIONAL_ASSESSMENT: 0-10
PAIN_FUNCTIONAL_ASSESSMENT: 0-10

## 2023-12-12 ASSESSMENT — ACTIVITIES OF DAILY LIVING (ADL)
JUDGMENT_ADEQUATE_SAFELY_COMPLETE_DAILY_ACTIVITIES: YES
WALKS IN HOME: INDEPENDENT
GROOMING: INDEPENDENT
TOILETING: INDEPENDENT
DRESSING YOURSELF: INDEPENDENT
PATIENT'S MEMORY ADEQUATE TO SAFELY COMPLETE DAILY ACTIVITIES?: YES
BATHING: INDEPENDENT
ADEQUATE_TO_COMPLETE_ADL: YES
HEARING - RIGHT EAR: FUNCTIONAL
HEARING - LEFT EAR: FUNCTIONAL
LACK_OF_TRANSPORTATION: NO
FEEDING YOURSELF: INDEPENDENT

## 2023-12-12 ASSESSMENT — LIFESTYLE VARIABLES
SKIP TO QUESTIONS 9-10: 1
SUBSTANCE_ABUSE_PAST_12_MONTHS: NO
AUDIT-C TOTAL SCORE: 0
PRESCIPTION_ABUSE_PAST_12_MONTHS: NO
HOW OFTEN DO YOU HAVE 6 OR MORE DRINKS ON ONE OCCASION: NEVER
AUDIT-C TOTAL SCORE: 0
HOW MANY STANDARD DRINKS CONTAINING ALCOHOL DO YOU HAVE ON A TYPICAL DAY: PATIENT DOES NOT DRINK
HOW OFTEN DO YOU HAVE A DRINK CONTAINING ALCOHOL: NEVER

## 2023-12-12 ASSESSMENT — PATIENT HEALTH QUESTIONNAIRE - PHQ9
SUM OF ALL RESPONSES TO PHQ9 QUESTIONS 1 & 2: 0
2. FEELING DOWN, DEPRESSED OR HOPELESS: NOT AT ALL
1. LITTLE INTEREST OR PLEASURE IN DOING THINGS: NOT AT ALL

## 2023-12-12 ASSESSMENT — COLUMBIA-SUICIDE SEVERITY RATING SCALE - C-SSRS
2. HAVE YOU ACTUALLY HAD ANY THOUGHTS OF KILLING YOURSELF?: NO
6. HAVE YOU EVER DONE ANYTHING, STARTED TO DO ANYTHING, OR PREPARED TO DO ANYTHING TO END YOUR LIFE?: NO
1. IN THE PAST MONTH, HAVE YOU WISHED YOU WERE DEAD OR WISHED YOU COULD GO TO SLEEP AND NOT WAKE UP?: NO

## 2023-12-12 ASSESSMENT — PAIN DESCRIPTION - LOCATION: LOCATION: KNEE

## 2023-12-12 ASSESSMENT — PAIN DESCRIPTION - DESCRIPTORS: DESCRIPTORS: ACHING;DULL

## 2023-12-12 NOTE — PERIOPERATIVE NURSING NOTE
Patient to PACU 8 with oral airway in place. RN removed oral airway at 1050. ACE wrap left knee clean/dry/intact.     1123: Dr. Campbell notified regarding patient pain 10/10. Total of 0.8mg Dilaudid given to patient with no decrease in pain score. See chart for orders.     1134: Ice chips provided to patient. Patient tolerated.     1145: Xray to bedside.

## 2023-12-12 NOTE — ANESTHESIA PROCEDURE NOTES
Airway  Date/Time: 12/12/2023 8:11 AM  Urgency: elective    Airway not difficult    Staffing  Performed: resident   Authorized by: Carter Vazquez MD    Performed by: EM Croft  Patient location during procedure: OR    Indications and Patient Condition  Indications for airway management: anesthesia and airway protection  Spontaneous ventilation: present  Preoxygenated: yes  Mask difficulty assessment: 1 - vent by mask    Final Airway Details  Final airway type: supraglottic airway      Successful airway: Supraglottic airway: Ambu.  Size 5     Number of attempts at approach: 1  Number of other approaches attempted: 0    Additional Comments  LMA placed by paramedic student

## 2023-12-12 NOTE — ANESTHESIA POSTPROCEDURE EVALUATION
Patient: Lorenzo Peraza    Procedure Summary       Date: 12/12/23 Room / Location: [facility] OR 08 / Virtual ANGI OR    Anesthesia Start: 0751 Anesthesia Stop: 1042    Procedure: Arthroplasty Total Knee (Left: Knee) Diagnosis:       Primary osteoarthritis of left knee      (Primary osteoarthritis of left knee [M17.12])    Surgeons: Dutch Ramirez MD Responsible Provider: Carter Vazquez MD    Anesthesia Type: general, MAC ASA Status: 2            Anesthesia Type: general, MAC    Vitals Value Taken Time   /70 12/12/23 1501   Temp 36 °C (96.8 °F) 12/12/23 1215   Pulse 67 12/12/23 1501   Resp 14 12/12/23 1501   SpO2 100 % 12/12/23 1400   Vitals shown include unvalidated device data.    Anesthesia Post Evaluation    Patient location during evaluation: PACU  Patient participation: complete - patient participated  Level of consciousness: awake  Pain score: 2  Pain management: adequate  Multimodal analgesia pain management approach  Airway patency: patent  Two or more strategies used to mitigate risk of obstructive sleep apnea  Cardiovascular status: acceptable  Respiratory status: acceptable  Hydration status: acceptable  Postoperative Nausea and Vomiting: none        There were no known notable events for this encounter.

## 2023-12-12 NOTE — SIGNIFICANT EVENT
Pt sitting up talking with wife. Pain 4-5 of 10 and tolerable.in left knee. Presented with food and fluids. No nausea. Held in PACU awaiting room assignment-no bed availabiliity @ this time. POC unchanged.

## 2023-12-12 NOTE — ANESTHESIA PREPROCEDURE EVALUATION
Patient: Lorenzo Peraza    Procedure Information       Date/Time: 12/12/23 0730    Procedure: Arthroplasty Total Knee (Left: Knee)    Location: [facility] 08 / [facility]    Surgeons: Dutch Ramirez MD            Relevant Problems   Anesthesia   (+) History of general anesthesia      Cardiovascular   (+) Heart murmur   (+) Hyperlipidemia      Endocrine (within normal limits)      GI   (+) Esophageal reflux      /Renal (within normal limits)      Neuro/Psych (within normal limits)      Pulmonary (within normal limits)      GI/Hepatic (within normal limits)      Hematology (within normal limits)      Musculoskeletal   (+) Primary osteoarthritis of left knee      Eyes, Ears, Nose, and Throat (within normal limits)      Infectious Disease   (+) Tinea unguium       Clinical information reviewed:   Tobacco  Allergies  Meds   Med Hx  Surg Hx   Fam Hx  Soc Hx    No Known Allergies  Prior to Admission medications    Medication Sig Start Date End Date Taking? Authorizing Provider   chlorhexidine (Peridex) 0.12 % solution Use cap to measure 15 mL.  Swish/gargle mouthwash for at least 30 seconds.  Do not swallow.  Use night before surgery after brushing teeth and morning of surgery after brushing teeth. 12/5/23  Yes MASSIMO Ricardo-CNP   naproxen (Naprosyn) 250 mg tablet Take 1 tablet (250 mg) by mouth 2 times a day as needed for mild pain (1 - 3).    Historical Provider, MD     Past Medical History:   Diagnosis Date    Body mass index (BMI) 26.0-26.9, adult 05/14/2021    BMI 26.0-26.9,adult    Heart murmur     since age 16 no intervention    Hyperlipidemia     Osteoarthritis      Past Surgical History:   Procedure Laterality Date    COLONOSCOPY      x 2    CT HEAD ANGIO W AND WO IV CONTRAST  06/22/2021    CT HEAD ANGIO W AND WO IV CONTRAST 6/22/2021 [facility] ANCILLARY LEGACY    KNEE  2017    KNEE Bilateral     scope    KNEE ARTHROPLASTY Right 02/2023    KNEE SURGERY  07/18/2017    Knee Surgery    TONSILLECTOMY            NPO Detail:  NPO/Void Status  Carbonhydrate Drink Given Prior to Surgery? : N  Date of Last Liquid: 12/11/23  Time of Last Liquid: 1730  Date of Last Solid: 12/11/23  Time of Last Solid: 1730  Last Intake Type: Light meal  Time of Last Void: 0415         Physical Exam    Airway  Mallampati: I  TM distance: >3 FB  Neck ROM: full     Cardiovascular - normal exam     Dental - normal exam     Pulmonary - normal exam     Abdominal - normal exam             Anesthesia Plan    ASA 2     general and MAC   (Adductor canal block)  The patient is not a current smoker.  Education provided regarding risk of obstructive sleep apnea.  intravenous induction   Postoperative administration of opioids is intended.  Anesthetic plan and risks discussed with patient.    Plan discussed with CAA and CRNA.

## 2023-12-12 NOTE — ANESTHESIA PROCEDURE NOTES
Peripheral Block    Patient location during procedure: pre-op  Start time: 12/12/2023 7:43 AM  End time: 12/12/2023 7:44 AM  Reason for block: at surgeon's request and post-op pain management  Staffing  Performed: attending   Authorized by: Carter Vazquez MD    Performed by: Carter Vazquez MD  Preanesthetic Checklist  Completed: patient identified, IV checked, site marked, risks and benefits discussed, surgical consent, monitors and equipment checked, pre-op evaluation and timeout performed   Timeout performed at: 12/12/2023 7:39 AM  Peripheral Block  Patient position: laying flat  Prep: ChloraPrep  Patient monitoring: heart rate, cardiac monitor and continuous pulse ox  Block type: adductor canal  Laterality: left  Injection technique: single-shot  Guidance: ultrasound guided  Needle  Needle gauge: 21 G  Needle length: 10 cm  Needle localization: ultrasound guidance  Test dose: negative  Assessment  Injection assessment: negative aspiration for heme, no paresthesia on injection, incremental injection and local visualized surrounding nerve on ultrasound  Paresthesia pain: none  Heart rate change: no  Slow fractionated injection: yes  Additional Notes  Ropivicaine 20 ml 0.5 % with 5 mg Decadron

## 2023-12-12 NOTE — OP NOTE
Arthroplasty Total Knee (L) Operative Note     Date: 2023  OR Location: [facility]    Name: Lorenzo Peraza, : 1960, Age: 63 y.o., MRN: [id number], Sex: male    Diagnosis  Pre-op Diagnosis     * Primary osteoarthritis of left knee [M17.12] Post-op Diagnosis     * Primary osteoarthritis of left knee [M17.12]     Procedures  Arthroplasty Total Knee  69835 - FL ARTHRP KNE CONDYLE&PLATU MEDIAL&LAT COMPARTMENTS      Surgeons      * Dutch Ramirez - Primary    Resident/Fellow/Other Assistant:  Surgeon(s) and Role:    Procedure Summary  Anesthesia: General  ASA: II  Anesthesia Staff: Anesthesiologist: Carter Vazquez MD  CRNA: MASSIMO Croft-CRNA  Estimated Blood Loss: 200 mL  Intra-op Medications:   Medication Name Total Dose   vancomycin (Vancocin) vial for injection 2 g   ceFAZolin in dextrose (iso-os) (Ancef) IVPB 2 g 2 g              Anesthesia Record               Intraprocedure I/O Totals          Intake    .00 mL    ceFAZolin in dextrose (iso-os) (Ancef) IVPB 2 g 100.00 mL    Total Intake 900 mL          Specimen: No specimens collected     Staff:   Circulator: Elis Rodrigez RN  Scrub Person: Jeison Villarreal RN; Azeem Perez  RNFA: Beto Barney         Drains and/or Catheters: * None in log *    Tourniquet Times:     Total Tourniquet Time Documented:  Thigh (Left) - 87 minutes  Total: Thigh (Left) - 87 minutes      Implants:  Implants       Type Name Action Serial No.       SIMPLEX HV RADIOPAQUE BONE CEMENT Implanted       SIMPLEX HV RADIOPAQUE BONE CEMENT Implanted       MOSAIC MODULAR PROXIMAL HUMERAL BODY STD Implanted      Joint Knee FEMORAL, ATTUNE PS, HEIDE, SZ 7, LT - [id number] Implanted      Joint Knee INSERT, ATTUNE PS FB, SZ 7, 7MM - [id number] Implanted      Joint Knee TIBAL BASE, ATTUNE FP, HEIDE, SZ 8 - [id number] Implanted      Joint Knee DOME, PATELLA, MEDIALIZED, 41MM - [id number] Implanted               Findings:  see procedure details below    Indications:  Lorenzo Peraza is an 63 y.o. male who is having surgery for Primary osteoarthritis of left knee [M17.12].  this patient states that his left knee painis disabling and impairs the patient´s ability to walk and do other activities that are important including participating in activities with family. AP and lateral x-rays show osteoarthritis of the left knee that is worse at the medial compartment with loss of joint surface cartilage, bone on bone and sclerosis. Left total knee replacement with indications, alternatives, potential risks including but not limited to blood loss, blood clot, nerve damage, infection, death and stroke, benefits, unforseen risks, the rehab involved and the fact that no guarantee can be made were all discussed with the patient in detail. The patient understands, and wishes to  proceed with left total knee replacement. I agree. Please note that this report has been produced using speech recognition software.  It may contain errors related to grammar, punctuation or spelling.  Electronically signed, but not     The patient was seen in the preoperative area. The risks, benefits, complications, treatment options, non-operative alternatives, expected recovery and outcomes were discussed with the patient. The possibilities of reaction to medication, pulmonary aspiration, injury to surrounding structures, bleeding, recurrent infection, the need for additional procedures, failure to diagnose a condition, and creating a complication requiring transfusion or operation were discussed with the patient. The patient concurred with the proposed plan, giving informed consent.  The site of surgery was properly noted/marked if necessary per policy. The patient has been actively warmed in preoperative area. Preoperative antibiotics have been ordered and given within 1 hours of incision. Venous thrombosis prophylaxis have been ordered including unilateral sequential compression device    Procedure Details:   the patient was taken to the operating room and was placed under general anesthesia without complications. Tourniquet was placed around the   Left thigh. The left knee was examined under anesthesia and there was loss of extension and also instability with stressing. The  left knee was then prepped using triple prep of chlorhexidine, alcohol and ChloraPrep and after allowing the prep to dry was draped in the usual sterile manner. Time-out was done. The  left lower extremity was exsanguinated using an Esmarch bandage and the tourniquet was inflated. I first made an anterior midline incision. I carried the incision down through skin and subcutaneous tissue. Throughout the entire procedure hemostasis was achieved using the Bovie and throughout the entire procedure the knee was irrigated with saline. The knee was soaked in Irrisept solution for 2 minutes and then irrigated until clear. The knee joint was entered via a median parapatellar arthrotomy incision. Immediately I noted that there was severe osteoarthritis of the  left knee with complete loss of joint surface cartilage, sclerosis and bone-on-bone at the medial compartment and also at the patellofemoral compartment. There was also wearing of the lateral joint surface cartilage. I first balanced the soft tissues medially and laterally and then protected the medial and lateral collateral ligaments throughout the entire procedure with Kevin retractors. I next removed the remnants of the medial and lateral menisci and anterior cruciate ligament. The femoral canal was found using an intramedullary drill. I made the femoral osteotomy at 10 millimeters and 5 degrees valgus. Using the anterior down Sizer I measured the femur at size  7. After making alignment marks in the femur and after checking with an Gibran wing to be sure that I would not notch the femur I made the anterior, posterior and chamfer osteotomies along the lines of the size 7 femoral cutting template. I  made the notch osteotomy using the size 7 femoral notch cutting template.  I identified the interference screw in the femur and remove the interference screw.I then protected the posterior structures with a 2 prong PCL retractor. I made tibial osteotomy along the lines of the tibial cutting template and measured the tibia at size  8. I placed  6 and 7 millimeter fixed bearing stabilized tibial insert trials and there was full extension and excellent stability to anterior-posterior and valgus-varus stressing with the  7 millimeter stabilized insert trial.  the 8 mm thickness tibial stabilized insert could not be reduced.With the knee in extension I marked alignment marks at the anterior tibia. I then measured the patella at   41 millimeters and using the patellar Reamer reamed away the arthritic surface of the patella. I drilled 3 peg holes in the patella along the lines of the 4141 millimeter guide. I snapped the trial oval dome 3 peg  41 millimeter patella trial onto the patella. I placed the knee through range of motion and range of motion was from 0-125 degrees. There was excellent stability to anterior-posterior and valgus-varus stressing at 0 degrees, 30 degrees, 45 degrees, 60 degrees, 90 degrees and 120 degrees. The patella stayed in the midline with range of motion. I then removed all trials. I again placed the size   8 tibial trial onto the tibial along the alignment marks and reamed and then impacted the tibial along the lines of the size  8 tibial trial. I removed the tibial trial. I then soaked the knee in Irrisept solution for 3 minutes and then irrigated the femoral, tibial and patellar surfaces until clear using the pulse lavage. We then dried the bony surfaces of the femur, tibia and patella. Two packets of simplex HV cement were mixed with 1 gram of vancomycin. When the cement was of the proper consistency I placed the cement over the tibial surface and impacted the size 8 attune fixed bearing  tibial tray onto the tibia. There was excellent seating and excess cement was removed using a knife and curette. I then placed cement over the bony surfaces of the femur and the posterior condyles of the femoral prosthesis and impacted the size  7 attune left posterior stabilized femoral prosthesis on to the femur. There was excellent seating and excess cement was removed using a knife and curette. I then irrigated the knee with a pulse lavage and then impacted the size  7-7 millimeter fixed bearing stabilized  attunetibial insert onto the tibial tray. There was excellent seating. The knee was then held in extension until the cement hardened completely. I placed cement over the bony surface of the patella and snapped the 41 millimeter oval dome 3 peg patella onto the patella. There was excellent seating and the patellar spur prosthesis was held with the patellar clamp until the cement hardened completely. The knee was held in extension until the cement hardened completely. The knee was then soaked in Irrisept solution for 2 minutes and then irrigated until clear. The tourniquet was deflated and the knee was irrigated with saline and hemostasis was achieved using the Bovie. I made a thorough examination for any loose fragments of bone or cement and no loose fragments of bone or cement were seen. I then placed the knee through a range of motion and range of motion was from 0-125 degrees. The patella stayed in the midline with range of motion. The  left knee was stable to anterior-posterior and valgus-varus stressing at 0 degrees 30 degrees 45 degree 60 degrees 90 degrees and 120 degrees. The medial and lateral collateral ligaments were palpated and noted to be intact. The fascia was closed using interrupted and running 1. Vicryl suture. Subcutaneous tissue was closed using running 2 0 Vicryl. Skin was closed using running subcuticular Monocryl. Sterile Mepilex dressing was applied and the patient returned to the PACU  in stable condition.  Complications:  None; patient tolerated the procedure well.    Disposition: PACU - hemodynamically stable.  Condition: stable         Additional Details:  no additional details    Attending Attestation: I performed the procedure.    Dutch Ramirez  Phone Number: [phone]

## 2023-12-13 ENCOUNTER — PHARMACY VISIT (OUTPATIENT)
Dept: PHARMACY | Facility: CLINIC | Age: 63
End: 2023-12-13
Payer: MEDICARE

## 2023-12-13 ENCOUNTER — TELEPHONE (OUTPATIENT)
Dept: ORTHOPEDIC SURGERY | Facility: CLINIC | Age: 63
End: 2023-12-13

## 2023-12-13 ENCOUNTER — HOME HEALTH ADMISSION (OUTPATIENT)
Dept: HOME HEALTH SERVICES | Facility: HOME HEALTH | Age: 63
End: 2023-12-13
Payer: COMMERCIAL

## 2023-12-13 VITALS
HEIGHT: 73 IN | WEIGHT: 190 LBS | BODY MASS INDEX: 25.18 KG/M2 | DIASTOLIC BLOOD PRESSURE: 73 MMHG | RESPIRATION RATE: 18 BRPM | SYSTOLIC BLOOD PRESSURE: 129 MMHG | OXYGEN SATURATION: 98 % | TEMPERATURE: 98.6 F | HEART RATE: 67 BPM

## 2023-12-13 LAB
ANION GAP SERPL CALC-SCNC: 8 MMOL/L
BUN SERPL-MCNC: 16 MG/DL (ref 8–25)
CALCIUM SERPL-MCNC: 8.8 MG/DL (ref 8.5–10.4)
CHLORIDE SERPL-SCNC: 106 MMOL/L (ref 97–107)
CO2 SERPL-SCNC: 25 MMOL/L (ref 24–31)
CREAT SERPL-MCNC: 1.2 MG/DL (ref 0.4–1.6)
ERYTHROCYTE [DISTWIDTH] IN BLOOD BY AUTOMATED COUNT: 12.7 % (ref 11.5–14.5)
FERRITIN SERPL-MCNC: 156 NG/ML (ref 30–400)
GFR SERPL CREATININE-BSD FRML MDRD: 68 ML/MIN/1.73M*2
GLUCOSE SERPL-MCNC: 140 MG/DL (ref 65–99)
HCT VFR BLD AUTO: 34.5 % (ref 41–52)
HGB BLD-MCNC: 11.5 G/DL (ref 13.5–17.5)
IRON SATN MFR SERPL: 21 % (ref 12–50)
IRON SERPL-MCNC: 44 UG/DL (ref 45–160)
MCH RBC QN AUTO: 30.7 PG (ref 26–34)
MCHC RBC AUTO-ENTMCNC: 33.3 G/DL (ref 32–36)
MCV RBC AUTO: 92 FL (ref 80–100)
NRBC BLD-RTO: 0 /100 WBCS (ref 0–0)
PLATELET # BLD AUTO: 187 X10*3/UL (ref 150–450)
POTASSIUM SERPL-SCNC: 4.4 MMOL/L (ref 3.4–5.1)
RBC # BLD AUTO: 3.75 X10*6/UL (ref 4.5–5.9)
SODIUM SERPL-SCNC: 139 MMOL/L (ref 133–145)
TIBC SERPL-MCNC: 210 UG/DL (ref 228–428)
UIBC SERPL-MCNC: 166 UG/DL (ref 110–370)
WBC # BLD AUTO: 9.2 X10*3/UL (ref 4.4–11.3)

## 2023-12-13 PROCEDURE — 97110 THERAPEUTIC EXERCISES: CPT | Mod: GP

## 2023-12-13 PROCEDURE — 36415 COLL VENOUS BLD VENIPUNCTURE: CPT | Performed by: ORTHOPAEDIC SURGERY

## 2023-12-13 PROCEDURE — RXMED WILLOW AMBULATORY MEDICATION CHARGE

## 2023-12-13 PROCEDURE — 97166 OT EVAL MOD COMPLEX 45 MIN: CPT | Mod: GO

## 2023-12-13 PROCEDURE — 99024 POSTOP FOLLOW-UP VISIT: CPT | Performed by: PHYSICIAN ASSISTANT

## 2023-12-13 PROCEDURE — 80048 BASIC METABOLIC PNL TOTAL CA: CPT | Performed by: ORTHOPAEDIC SURGERY

## 2023-12-13 PROCEDURE — 97116 GAIT TRAINING THERAPY: CPT | Mod: GP

## 2023-12-13 PROCEDURE — 97161 PT EVAL LOW COMPLEX 20 MIN: CPT | Mod: GP

## 2023-12-13 PROCEDURE — 82728 ASSAY OF FERRITIN: CPT | Performed by: NURSE PRACTITIONER

## 2023-12-13 PROCEDURE — 2500000004 HC RX 250 GENERAL PHARMACY W/ HCPCS (ALT 636 FOR OP/ED): Performed by: ORTHOPAEDIC SURGERY

## 2023-12-13 PROCEDURE — G0378 HOSPITAL OBSERVATION PER HR: HCPCS

## 2023-12-13 PROCEDURE — 85027 COMPLETE CBC AUTOMATED: CPT | Performed by: ORTHOPAEDIC SURGERY

## 2023-12-13 PROCEDURE — 83540 ASSAY OF IRON: CPT | Performed by: NURSE PRACTITIONER

## 2023-12-13 PROCEDURE — 2500000001 HC RX 250 WO HCPCS SELF ADMINISTERED DRUGS (ALT 637 FOR MEDICARE OP): Performed by: ORTHOPAEDIC SURGERY

## 2023-12-13 RX ORDER — HYDROCODONE BITARTRATE AND ACETAMINOPHEN 5; 325 MG/1; MG/1
1 TABLET ORAL EVERY 6 HOURS PRN
Qty: 28 TABLET | Refills: 0 | Status: SHIPPED | OUTPATIENT
Start: 2023-12-13 | End: 2024-02-02 | Stop reason: ALTCHOICE

## 2023-12-13 RX ADMIN — FERROUS SULFATE TAB 325 MG (65 MG ELEMENTAL FE) 2 TABLET: 325 (65 FE) TAB at 08:48

## 2023-12-13 RX ADMIN — HYDROCODONE BITARTRATE AND ACETAMINOPHEN 1 TABLET: 5; 325 TABLET ORAL at 10:18

## 2023-12-13 RX ADMIN — ACETAMINOPHEN 650 MG: 325 TABLET ORAL at 11:28

## 2023-12-13 RX ADMIN — CEFAZOLIN SODIUM 2 G: 2 INJECTION, SOLUTION INTRAVENOUS at 06:34

## 2023-12-13 RX ADMIN — APIXABAN 2.5 MG: 2.5 TABLET, FILM COATED ORAL at 08:48

## 2023-12-13 RX ADMIN — HYDROCODONE BITARTRATE AND ACETAMINOPHEN 1 TABLET: 5; 325 TABLET ORAL at 06:35

## 2023-12-13 RX ADMIN — ACETAMINOPHEN 650 MG: 325 TABLET ORAL at 06:34

## 2023-12-13 ASSESSMENT — COGNITIVE AND FUNCTIONAL STATUS - GENERAL
MOVING FROM LYING ON BACK TO SITTING ON SIDE OF FLAT BED WITH BEDRAILS: A LITTLE
TOILETING: A LITTLE
TURNING FROM BACK TO SIDE WHILE IN FLAT BAD: A LITTLE
WALKING IN HOSPITAL ROOM: A LITTLE
CLIMB 3 TO 5 STEPS WITH RAILING: A LOT
MOVING TO AND FROM BED TO CHAIR: A LITTLE
MOVING TO AND FROM BED TO CHAIR: A LITTLE
CLIMB 3 TO 5 STEPS WITH RAILING: A LITTLE
DRESSING REGULAR LOWER BODY CLOTHING: A LITTLE
HELP NEEDED FOR BATHING: A LITTLE
DAILY ACTIVITIY SCORE: 20
MOBILITY SCORE: 17
WALKING IN HOSPITAL ROOM: A LITTLE
MOBILITY SCORE: 20
STANDING UP FROM CHAIR USING ARMS: A LITTLE
STANDING UP FROM CHAIR USING ARMS: A LITTLE
PERSONAL GROOMING: A LITTLE

## 2023-12-13 ASSESSMENT — PAIN SCALES - GENERAL
PAINLEVEL_OUTOF10: 10 - WORST POSSIBLE PAIN
PAINLEVEL_OUTOF10: 0 - NO PAIN
PAINLEVEL_OUTOF10: 6
PAINLEVEL_OUTOF10: 2
PAINLEVEL_OUTOF10: 4
PAINLEVEL_OUTOF10: 4
PAINLEVEL_OUTOF10: 0 - NO PAIN
PAINLEVEL_OUTOF10: 4

## 2023-12-13 ASSESSMENT — ACTIVITIES OF DAILY LIVING (ADL)
BATHING_ASSISTANCE: MINIMAL
ADL_ASSISTANCE: INDEPENDENT
ADL_ASSISTANCE: INDEPENDENT

## 2023-12-13 ASSESSMENT — PAIN - FUNCTIONAL ASSESSMENT
PAIN_FUNCTIONAL_ASSESSMENT: 0-10
PAIN_FUNCTIONAL_ASSESSMENT: FLACC (FACE, LEGS, ACTIVITY, CRY, CONSOLABILITY)

## 2023-12-13 NOTE — NURSING NOTE
Pt arrived to room in stable condition. Oriented to room and call light system. Dressing to left knee is dry and intact. Call light in reach

## 2023-12-13 NOTE — PROGRESS NOTES
TCC met with patient at the bedside. Assessment complete. Pt is agreeable to [facility]. Internal HH referral was placed. [facility] is processing and checking insurance then will have a SOC.   PLAN AT THE TIME OF DISCHARGE IS FOR PATIENT TO RETURN HOME WITH [facility], PENDING INSURANCE CHECK AND SOC.     UPDATE: PATIENT HAS SAFE DISCHARGE PLAN TO RETURN HOME WITH [facility], SOC IS 12/14/23.       12/12/23 1955   Discharge Planning   Living Arrangements Spouse/significant other   Support Systems Spouse/significant other   Assistance Needed no   Type of Residence Private residence   Number of Stairs to Enter Residence 1   Number of Stairs Within Residence 12   Do you have animals or pets at home? Yes   Type of Animals or Pets dogs, cats   Who is requesting discharge planning? Patient   Home or Post Acute Services In home services   Type of Home Care Services Home PT   Patient expects to be discharged to: home   Does the patient need discharge transport arranged? No   Financial Resource Strain   How hard is it for you to pay for the very basics like food, housing, medical care, and heating? Not hard   Housing Stability   In the last 12 months, was there a time when you were not able to pay the mortgage or rent on time? N   In the last 12 months, how many places have you lived? 1   In the last 12 months, was there a time when you did not have a steady place to sleep or slept in a shelter (including now)? N   Transportation Needs   In the past 12 months, has lack of transportation kept you from medical appointments or from getting medications? no   In the past 12 months, has lack of transportation kept you from meetings, work, or from getting things needed for daily living? No   Patient Choice   Provider Choice list and CMS website (https://medicare.gov/care-compare#search) for post-acute Quality and Resource Measure Data were provided and reviewed with: Patient   Patient / Family choosing to utilize agency / facility established prior to  hospitalization Yes

## 2023-12-13 NOTE — DISCHARGE SUMMARY
Discharge Diagnosis  Primary osteoarthritis of left knee    Issues Requiring Follow-Up  Post op left total knee on 12-    Test Results Pending At Discharge  Pending Labs       No current pending labs.            Hospital Course   Patient admitted post operatively. Did well post op and is being discharged home with home healthcare.     Pertinent Physical Exam At Time of Discharge  Physical Exam  Left knee: Dressing clean and dry. Nontender in the left calf. Neruvoascular is intact.     Home Medications     Medication List      START taking these medications     apixaban 2.5 mg tablet; Commonly known as: Eliquis; Take 1 tablet (2.5   mg) by mouth every 12 hours for 14 days.   HYDROcodone-acetaminophen 5-325 mg tablet; Commonly known as: Norco;   Take 1 tablet by mouth every 6 hours if needed for severe pain (7 - 10).     STOP taking these medications     chlorhexidine 0.12 % solution; Commonly known as: Peridex   naproxen 250 mg tablet; Commonly known as: Naprosyn       Outpatient Follow-Up  Future Appointments   Date Time Provider Department Center   1/5/2024 10:00 AM Marjorie Narvaez PA-C [id number] [facility]   5/10/2024  7:30 AM MASSIMO Betancourt-CNP [id number] [facility]       Marjorie Narvaez PA-C

## 2023-12-13 NOTE — PROGRESS NOTES
Physical Therapy    Physical Therapy Treatment    Patient Name: Lorenzo Peraza  MRN: [id number]  Today's Date: 12/13/2023  Time Calculation  Start Time: 1325  Stop Time: 1348  Time Calculation (min): 23 min       Assessment/Plan   PT Assessment  PT Assessment Results: Decreased strength, Decreased range of motion, Decreased endurance, Impaired balance, Decreased mobility, Impaired judgement, Decreased safety awareness, Orthopedic restrictions, Pain  Rehab Prognosis: Good  Evaluation/Treatment Tolerance: Patient tolerated treatment well  Medical Staff Made Aware: Yes  Strengths: Ability to acquire knowledge, Premorbid level of function  End of Session Communication: Bedside nurse  Assessment Comment: Improved gait quality/tolerance, able to negotiate stairs with supervision, intermittent cueing for safety/sequencing, will continue to treat as tolerated.  End of Session Patient Position: Up in chair  PT Plan  Inpatient/Swing Bed or Outpatient: Inpatient  PT Plan  Treatment/Interventions: Bed mobility, Transfer training, Gait training, Stair training, Balance training, Neuromuscular re-education, Strengthening, Endurance training, Range of motion, Therapeutic exercise, Therapeutic activity  PT Plan: Skilled PT  PT Frequency: BID  PT Discharge Recommendations: Low intensity level of continued care  Equipment Recommended upon Discharge: Wheeled walker  PT Recommended Transfer Status: Stand by assist  PT - OK to Discharge: Yes    General Visit Information:   PT  Visit  PT Received On: 12/13/23  Response to Previous Treatment: Patient with no complaints from previous session.  General  Reason for Referral: impaired functional mobility (Pt is a 63 year-old M s/p elective L TKR via Dr. Ramirez on 12/12/23.)  Referred By: Dr. Ashley MD  Past Medical History Relevant to Rehab: HLD, R TKR  Family/Caregiver Present: No  Co-Treatment:  (no)  Co-Treatment Reason: n/a  Prior to Session Communication: Bedside nurse  Patient  Position Received: Bed, 3 rail up, Alarm off, not on at start of session  Preferred Learning Style: verbal  General Comment: Pt cleared for therapy via RN, received in supine, NAD, agreeable to participate in therapy.    Subjective   Precautions:  Precautions  Hearing/Visual Limitations: reading glasses; [place]  LE Weight Bearing Status: Weight Bearing as Tolerated  Medical Precautions: Fall precautions  Post-Surgical Precautions: Left total knee precautions  Precautions Comment: good recall of L TKR precautions    Objective   Pain:  Pain Assessment  Pain Assessment: 0-10  Pain Score: 4  Pain Type: Surgical pain  Pain Location: Knee  Pain Orientation: Left  Pain Interventions: Repositioned, Ambulation/increased activity  Cognition:  Cognition  Overall Cognitive Status: Within Functional Limits  Insight: Mild  Impulsive: Mildly  Postural Control:  Postural Control  Postural Control: Within Functional Limits  Extremity/Trunk Assessments:  RLE   RLE : Within Functional Limits  LLE   LLE : Exceptions to WFL (AAROM L knee ~5-60 degrees)  Activity Tolerance:  Activity Tolerance  Endurance: Tolerates 10 - 20 min exercise with multiple rests  Treatments:  Therapeutic Exercise  Therapeutic Exercise Performed: Yes (handout issued/reviewed with good understanding demonstrated)  Therapeutic Exercise Activity 1: B ankle pumps x 10  Therapeutic Exercise Activity 2: B heel slides x 10 (AAROM LLE)  Therapeutic Exercise Activity 3: B hip abd slides x 10 (AAROM LLE)  Therapeutic Exercise Activity 4: B quad sets x 10  Therapeutic Exercise Activity 5: LLE SLR x 6 (AAROM; pt declined further SLRs d/t c/o pain)    Therapeutic Activity  Therapeutic Activity Performed: Yes  Therapeutic Activity 1: supervision for dynamic standing balance in bathroom for toileting and handwashing    Bed Mobility  Bed Mobility: Yes  Bed Mobility 1  Bed Mobility 1: Supine to sitting  Level of Assistance 1: Close supervision  Bed Mobility Comments 1: assist to  advance LLE off EOB; use of bedrails, HOB elevated    Ambulation/Gait Training  Ambulation/Gait Training Performed: Yes  Ambulation/Gait Training 1  Surface 1: Level tile  Device 1: Rolling walker  Assistance 1: Close supervision  Quality of Gait 1:  (forward head, cueing to remain within ELIZABETH of RW, reciprocating pattern)  Comments/Distance (ft) 1: 30', 50' x 2  Transfers  Transfer: Yes  Transfer 1  Transfer From 1: Sit to  Transfer to 1: Stand  Technique 1: Sit to stand  Transfer Device 1: Walker  Transfer Level of Assistance 1: Close supervision  Trials/Comments 1: assist to steady; cueing for sequencing  Transfers 2  Transfer From 2: Stand to  Transfer to 2: Sit  Technique 2: Stand to sit  Transfer Device 2: Walker  Transfer Level of Assistance 2: Close supervision  Trials/Comments 2: assist for eccentric lowering into chair, cueing for sequencing    Stairs  Stairs: Yes  Stairs  Rails 1: Bilateral  Curb Step 1: No  Device 1: Railing  Assistance 1: Close supervision  Comment/Number of Steps 1: 4 x 3 (non-reciprocating pattern to ascend/descend)    Outcome Measures:  [facility] Basic Mobility  Turning from your back to your side while in a flat bed without using bedrails: None  Moving from lying on your back to sitting on the side of a flat bed without using bedrails: None  Moving to and from bed to chair (including a wheelchair): A little  Standing up from a chair using your arms (e.g. wheelchair or bedside chair): A little  To walk in hospital room: A little  Climbing 3-5 steps with railing: A little  Basic Mobility - Total Score: 20    Education Documentation  Handouts, taught by Anu Tenorio, PT at 12/13/2023  2:25 PM.  Learner: Patient  Readiness: Acceptance  Method: Explanation, Demonstration  Response: Needs Reinforcement    Precautions, taught by Anu Tenorio, PT at 12/13/2023  2:25 PM.  Learner: Patient  Readiness: Acceptance  Method: Explanation, Demonstration  Response: Needs Reinforcement    Home  Exercise Program, taught by Anu Tenorio, PT at 12/13/2023  2:25 PM.  Learner: Patient  Readiness: Acceptance  Method: Explanation, Demonstration  Response: Needs Reinforcement    Mobility Training, taught by Anu Tenorio PT at 12/13/2023  2:25 PM.  Learner: Patient  Readiness: Acceptance  Method: Explanation, Demonstration  Response: Needs Reinforcement    Education Comments  No comments found.        OP EDUCATION:  Outpatient Education  Individual(s) Educated: Patient  Education Provided: Fall Risk, Home Exercise Program, Post-Op Precautions  Risk and Benefits Discussed with Patient/Caregiver/Other: yes  Patient/Caregiver Demonstrated Understanding: yes  Plan of Care Discussed and Agreed Upon: yes    Encounter Problems       Encounter Problems (Active)       Mobility       STG - Patient will ambulate 150' with RW and modified independence. (Progressing)       Start:  12/13/23    Expected End:  12/27/23            STG - Patient will ambulate up and down a curb/step with RW and modified independence. (Progressing)       Start:  12/13/23    Expected End:  12/27/23            STG - Patient will ascend and descend a flight of stairs with use of one handrail and modified independence. (Progressing)       Start:  12/13/23    Expected End:  12/27/23               Mobility       Pt will perform functional mobility household distances at mod I with use of RW.    (Progressing)       Start:  12/13/23    Expected End:  12/27/23               Pain          Precautions       Pt will independently maintain TKA precautions while completing ADL/IADL tasks and functional transfers/mobility.  (Progressing)       Start:  12/13/23    Expected End:  12/27/23               Safety       LTG - Patient will adhere to knee precautions during ADL's and transfers (Progressing)       Start:  12/13/23    Expected End:  12/27/23               Transfers       STG - Patient to transfer to and from sit to supine with independence.  (Progressing)       Start:  12/13/23    Expected End:  12/27/23            STG - Patient will transfer sit to and from stand with RW and modified independence. (Progressing)       Start:  12/13/23    Expected End:  12/27/23               Transfers       Pt will perform functional transfers at mod I. (Progressing)       Start:  12/13/23    Expected End:  12/27/23

## 2023-12-13 NOTE — NURSING NOTE
Assumed care of pt, pt resting quielty in bed, awaiting to see PT this am, call light within reach

## 2023-12-13 NOTE — PROGRESS NOTES
"Lorenzo Peraza is a 63 y.o. male on day 0 of admission presenting with Primary osteoarthritis of left knee.    Subjective   Patient post op Day #1. He is doing well sitting in chair bedside. Admits some left knee pain. Denies chest pain and shortness of breath. Wanting to be discharged home.       Objective     Physical Exam  Left knee: ACE wrap is removed. The incision is clean dry and well healing. Nontender in the left calf. Neurovascular is intact.     Last Recorded Vitals  Blood pressure 102/57, pulse 58, temperature 37 °C (98.6 °F), temperature source Oral, resp. rate 16, height 1.854 m (6' 1\"), weight 86.2 kg (190 lb), SpO2 96 %.  Intake/Output last 3 Shifts:  I/O last 3 completed shifts:  In: 1000 (11.6 mL/kg) [IV Piggyback:1000]  Out: 650 (7.5 mL/kg) [Urine:650 (0.2 mL/kg/hr)]  Weight: 86.2 kg     Relevant Results  XR knee left 1-2 views    Result Date: 12/12/2023  Interpreted By:  Oneyda Snow, STUDY: XR KNEE LEFT 1-2 VIEWS; ;  12/12/2023 11:49 am   INDICATION: Signs/Symptoms:Post-op knee.   COMPARISON: 10/06/2023.   ACCESSION NUMBER(S): [id number]   ORDERING CLINICIAN: SYL LANGFORD   FINDINGS: Post total left knee arthroplasty. Hardware appears intact. Postsurgical changes include edema/air about the left knee joint. No suspicious osseous findings.       Acceptable postsurgical changes from total left knee arthroplasty.     MACRO: None   Signed by: Oneyda Snow 12/12/2023 12:54 PM Dictation workstation:   [id number]    ECG 12 lead    Result Date: 12/6/2023  Sinus bradycardia with 1st degree AV block Otherwise normal ECG Confirmed by Lam Manley ([id number]) on 12/6/2023 9:03:50 PM      Scheduled medications  acetaminophen, 650 mg, oral, q6h STEPHEN  apixaban, 2.5 mg, oral, q12h STEPHEN  ferrous sulfate (325 mg ferrous sulfate), 2 tablet, oral, BID with meals  polyethylene glycol, 17 g, oral, Daily      Continuous medications  oxygen, 2 L/min, Last Rate: Stopped (12/12/23 1915)      PRN medications  PRN " medications: acetaminophen, HYDROcodone-acetaminophen, morphine, ondansetron **OR** ondansetron  Results for orders placed or performed during the hospital encounter of 12/12/23 (from the past 24 hour(s))   CBC   Result Value Ref Range    WBC 9.2 4.4 - 11.3 x10*3/uL    nRBC 0.0 0.0 - 0.0 /100 WBCs    RBC 3.75 (L) 4.50 - 5.90 x10*6/uL    Hemoglobin 11.5 (L) 13.5 - 17.5 g/dL    Hematocrit 34.5 (L) 41.0 - 52.0 %    MCV 92 80 - 100 fL    MCH 30.7 26.0 - 34.0 pg    MCHC 33.3 32.0 - 36.0 g/dL    RDW 12.7 11.5 - 14.5 %    Platelets 187 150 - 450 x10*3/uL   Basic metabolic panel   Result Value Ref Range    Glucose 140 (H) 65 - 99 mg/dL    Sodium 139 133 - 145 mmol/L    Potassium 4.4 3.4 - 5.1 mmol/L    Chloride 106 97 - 107 mmol/L    Bicarbonate 25 24 - 31 mmol/L    Urea Nitrogen 16 8 - 25 mg/dL    Creatinine 1.20 0.40 - 1.60 mg/dL    eGFR 68 >60 mL/min/1.73m*2    Calcium 8.8 8.5 - 10.4 mg/dL    Anion Gap 8 <=19 mmol/L   Iron and TIBC   Result Value Ref Range    Iron 44 (L) 45 - 160 ug/dL    UIBC 166 110 - 370 ug/dL    TIBC 210 (L) 228 - 428 ug/dL    % Saturation 21 12 - 50 %   Ferritin   Result Value Ref Range    Ferritin 156 30 - 400 ng/mL                  Assessment/Plan   Principal Problem:    Primary osteoarthritis of left knee  Active Problems:    History of general anesthesia    Patient doing well post op day #1. Hospitalist on consult. Eliquis for DVT prophylaxis. Continue pain regimen. Will discharge patient home with home health when medically optimized.        I spent 30 minutes in the professional and overall care of this patient.      Marjorie Narvaez PA-C

## 2023-12-13 NOTE — TREATMENT PLAN
Patient was seen and examined by me personally.  Please see detailed initial consult and physical exam in  Epic  by Meri Peck.CNP  I talked in detail with CNP regarding assessment and plan.    I agree with assessment and plan.   Patient was admitted for elective surgery.  Patient had a left knee arthroplasty.     General: Negative for fever,  chills or fatigue.    HEENT: Negative for headache, blurring of vision or double vision.    Cardiovascular: Negative for chest pain, palpitations or orthopnea.    Respiratory: Negative for cough, shortness of breath or wheezing.    Gastrointestinal: Negative for nausea, vomiting, hematemesis, abdominal pain or diarrhea.   Genitourinary: Negative for dysuria, hematuria, frequency or nocturia.   Musculoskeletal: Pain on the left knee.  .   Skin: Negative for rash, itching, or jaundice.   Hematologic: Negative for bleeding or bruising.   Neurologic: Negative for headache, loss of consciousness. syncope or seizures   Psychological: Negative for anxiety, hallucinations or depression.     General: In non acute distress, cooperating during physical exam.  HEENT: Pupils are equal and reactive to light and commendation , oral mucosa moist, no JVD oral mucosa is moist.  Cardiovascular: Normal sinus rhythm, murmur in apex  Lungs: Clear to auscultation bilaterally, no wheezing, no crackles, no dullness to percussion.  Abdomen: No hepatosplenomegaly appreciated, soft , not tender, positive bowel sounds, positive bowel movement.  Neuro: Alert and oriented x3, strength in upper and lower extremities , sensation intact.  Psych: Patient had great insight was going on  Musculoskeletal: Slight tenderness on his left knee, dressing in place, pulses are intact in left lower extremity.  Vascular: Pulses are intact in upper and lower extremities  Skin: No petechiae, ecchymosis or other stigmata for dermatology disease.     Left knee osteoarthritis  Status post left total knee  arthroplasty  Continue with pain medication  PT /OT  Plan to discharge home with home health care.    History of right total knee replacement    DVT prophylaxis  Sequential device    Heart Murmur  Not new for the patient.    Patient had a murmur since from his childhood  Patient will be discharged home with home health care  Thank you Dr. Ramirez for giving us chance to participate in this patient care.     Ed Spent with patient 50 minutes.

## 2023-12-13 NOTE — PROGRESS NOTES
Physical Therapy    Physical Therapy Evaluation & Treatment    Patient Name: Lorenzo Peraza  MRN: [id number]  Today's Date: 12/13/2023   Time Calculation  Start Time: 0815  Stop Time: 0840  Time Calculation (min): 25 min    Assessment/Plan   PT Assessment  PT Assessment Results: Decreased range of motion, Decreased strength, Decreased endurance, Impaired balance, Decreased mobility, Decreased safety awareness, Pain, Orthopedic restrictions  Rehab Prognosis: Good  Evaluation/Treatment Tolerance: Patient tolerated treatment well  Medical Staff Made Aware: Yes  Strengths: Ability to acquire knowledge, Premorbid level of function  End of Session Communication: Bedside nurse  Assessment Comment: Pt demonstrates impaired functional mobility from baseline level; pt with pain, impaired LLE ROM/strength, mildly impaired balance, and decreased overall activity tolerance; would benefit from continued skilled PT services during hospital stay and use of RW at all times.  End of Session Patient Position: Up in chair   IP OR SWING BED PT PLAN  Inpatient or Swing Bed: Inpatient  PT Plan  Treatment/Interventions: Bed mobility, Transfer training, Gait training, Stair training, Balance training, Neuromuscular re-education, Strengthening, Range of motion, Therapeutic exercise, Endurance training, Therapeutic activity  PT Plan: Skilled PT  PT Frequency: BID  PT Discharge Recommendations: Low intensity level of continued care  Equipment Recommended upon Discharge: Wheeled walker  PT Recommended Transfer Status: Assist x1  PT - OK to Discharge: Yes    Subjective     General Visit Information:  General  Reason for Referral: impaired functional mobility (Pt is a 63 year-old M s/p elective L TKR via Dr. Ramirez on 12/12/23.)  Referred By: Dr. Ashley MD  Past Medical History Relevant to Rehab: HLD, R TKR  Family/Caregiver Present: No  Co-Treatment: OT  Co-Treatment Reason: partial co-tx for safe pt handling on initial evaluation  Prior to  Session Communication: Bedside nurse  Patient Position Received: Bed, 3 rail up, Alarm on  Preferred Learning Style: verbal  General Comment: Pt cleared for therapy via RN, received in supine, NAD, agreeable to participate in therapy.  Home Living:  Home Living  Type of Home: House  Lives With: Spouse  Home Adaptive Equipment: Walker rolling or standard  Home Layout: Two level, 1/2 bath on main level  Home Access: Stairs to enter without rails  Entrance Stairs-Rails: None  Entrance Stairs-Number of Steps: 1  Bathroom Shower/Tub: Tub/shower unit  Bathroom Toilet: Standard  Bathroom Equipment: None  Home Living Comments: pt states will stay on first floor of home upon d/c  Prior Level of Function:  Prior Function Per Pt/Caregiver Report  Level of [place]: Independent with ADLs and functional transfers, Independent with homemaking with ambulation  Receives Help From: Family  ADL Assistance: Independent  Homemaking Assistance: Independent  Ambulatory Assistance: Independent  Vocational: Full time employment ()  Hand Dominance: Right  Prior Function Comments: denies h/o falls  Precautions:  Precautions  Hearing/Visual Limitations: reading glasses; [place]  LE Weight Bearing Status: Weight Bearing as Tolerated  Medical Precautions: Fall precautions  Post-Surgical Precautions: Left total knee precautions  Precautions Comment: L TKR precautions reviewed with good understanding verbalized    Objective   Pain:  Pain Assessment  Pain Assessment: 0-10  Pain Score: 4  Pain Type: Surgical pain  Pain Location: Knee  Pain Orientation: Left  Pain Interventions: Cold applied  Cognition:  Cognition  Overall Cognitive Status: Within Functional Limits  Insight: Mild  Impulsive: Mildly    General Assessments:  General Observation  General Observation: LLE ace wrapped; clean/intact     Activity Tolerance  Endurance: Tolerates 10 - 20 min exercise with multiple rests    Sensation  Light Touch: No apparent  deficits    Strength  Strength Comments: LLE > 3-/5, RLE > 4/5    Coordination  Movements are Fluid and Coordinated: Yes    Postural Control  Postural Control: Within Functional Limits    Static Sitting Balance  Static Sitting-Balance Support: No upper extremity supported  Static Sitting-Level of Assistance: Close supervision    Static Standing Balance  Static Standing-Balance Support: Bilateral upper extremity supported  Static Standing-Level of Assistance: Minimum assistance  Functional Assessments:       Bed Mobility  Bed Mobility: Yes  Bed Mobility 1  Bed Mobility 1: Supine to sitting  Level of Assistance 1: Minimum assistance  Bed Mobility Comments 1: assist to advance LLE off EOB; use of bedrails, HOB elevated    Transfers  Transfer: Yes  Transfer 1  Transfer From 1: Sit to  Transfer to 1: Stand  Technique 1: Sit to stand  Transfer Device 1: Walker  Transfer Level of Assistance 1: Minimum assistance  Trials/Comments 1: assist to steady; cueing for sequencing  Transfers 2  Transfer From 2: Stand to  Transfer to 2: Sit  Technique 2: Stand to sit  Transfer Device 2: Walker  Transfer Level of Assistance 2: Minimum assistance  Trials/Comments 2: assist for eccentric lowering into chair, cueing for sequencing    Ambulation/Gait Training  Ambulation/Gait Training Performed: Yes  Ambulation/Gait Training 1  Surface 1: Level tile  Device 1: Rolling walker  Assistance 1: Minimum assistance  Quality of Gait 1:  (decreased anahi, reciprocating pattern, steady gait; cueing for improved posture)  Comments/Distance (ft) 1: 50' x 2    Stairs  Stairs: No       Extremity/Trunk Assessments:  RLE   RLE : Within Functional Limits  LLE   LLE : Exceptions to WFL (AArOM L knee ~5-60 degrees)  Treatments:  Therapeutic Exercise  Therapeutic Exercise Performed: Yes  Therapeutic Exercise Activity 1: B ankle pumps x 10  Therapeutic Exercise Activity 2: B heel slides x 10 (AAROM LLE)  Therapeutic Exercise Activity 3: B hip abd slides x  10 (LUC MANZO)  Therapeutic Exercise Activity 4: B quad sets x 10  Outcome Measures:  [facility] Basic Mobility  Turning from your back to your side while in a flat bed without using bedrails: A little  Moving from lying on your back to sitting on the side of a flat bed without using bedrails: A little  Moving to and from bed to chair (including a wheelchair): A little  Standing up from a chair using your arms (e.g. wheelchair or bedside chair): A little  To walk in hospital room: A little  Climbing 3-5 steps with railing: A lot  Basic Mobility - Total Score: 17    Encounter Problems       Encounter Problems (Active)       Mobility       STG - Patient will ambulate 150' with RW and modified independence. (Progressing)       Start:  12/13/23    Expected End:  12/27/23            STG - Patient will ambulate up and down a curb/step with RW and modified independence. (Progressing)       Start:  12/13/23    Expected End:  12/27/23            STG - Patient will ascend and descend a flight of stairs with use of one handrail and modified independence. (Progressing)       Start:  12/13/23    Expected End:  12/27/23               Pain          Safety       LTG - Patient will adhere to knee precautions during ADL's and transfers (Progressing)       Start:  12/13/23    Expected End:  12/27/23               Transfers       STG - Patient to transfer to and from sit to supine with independence. (Progressing)       Start:  12/13/23    Expected End:  12/27/23            STG - Patient will transfer sit to and from stand with RW and modified independence. (Progressing)       Start:  12/13/23    Expected End:  12/27/23                   Education Documentation  Handouts, taught by Anu Tenorio, PT at 12/13/2023  9:30 AM.  Learner: Patient  Readiness: Acceptance  Method: Explanation, Demonstration  Response: Needs Reinforcement    Precautions, taught by Anu Tenorio, PT at 12/13/2023  9:30 AM.  Learner: Patient  Readiness:  Acceptance  Method: Explanation, Demonstration  Response: Needs Reinforcement    Home Exercise Program, taught by Anu Tenorio, PT at 12/13/2023  9:30 AM.  Learner: Patient  Readiness: Acceptance  Method: Explanation, Demonstration  Response: Needs Reinforcement    Mobility Training, taught by Anu Tenorio, PT at 12/13/2023  9:30 AM.  Learner: Patient  Readiness: Acceptance  Method: Explanation, Demonstration  Response: Needs Reinforcement    Education Comments  No comments found.

## 2023-12-13 NOTE — PROGRESS NOTES
Occupational Therapy    Evaluation    Patient Name: Lorenzo Peraza  MRN: [id number]  : 1960  Today's Date: 23  Time Calculation  Start Time: 816  Stop Time: 836  Time Calculation (min): 20 min       Assessment:  OT Assessment: Pt would benefit from acute OT services  Prognosis: Good  End of Session Communication: Bedside nurse  End of Session Patient Position: Up in chair (all needs in reach, RN aware)  OT Assessment Results: Decreased ADL status, Decreased endurance, Decreased functional mobility  Prognosis: Good  Strengths: Ability to acquire knowledge, Capable of completing ADLs semi/independent, Premorbid level of function, Support of extended family/friends  Plan:  Treatment Interventions: ADL retraining, Functional transfer training, UE strengthening/ROM, Patient/family training, Equipment evaluation/education  OT Frequency: 5 times per week  OT Discharge Recommendations: Low intensity level of continued care (recommend assist/supervision upon d/c)  OT - OK to Discharge: Yes  Treatment Interventions: ADL retraining, Functional transfer training, UE strengthening/ROM, Patient/family training, Equipment evaluation/education    Subjective     General:   OT Received On: 23  General  Reason for Referral: Pt is a 63 year-old M s/p elective L TKR via Dr. Ramirez on 23.  Past Medical History Relevant to Rehab: HLD, R TKR  Family/Caregiver Present: No  Co-Treatment: PT  Prior to Session Communication: Bedside nurse  Patient Position Received: Bed, 3 rail up, Alarm on  General Comment: Cleared by nursing. Pt pleasant and agreeable to therapy  Precautions:  Hearing/Visual Limitations: reading glasses; [place]  LE Weight Bearing Status: Weight Bearing as Tolerated  Medical Precautions: Fall precautions  Post-Surgical Precautions: Left total knee precautions  Precautions Comment: L TKR precautions reviewed, handout provided       Pain:  Pain Assessment  Pain Assessment: 0-10  Pain Score: 4  Pain  Type: Surgical pain  Pain Location: Knee  Pain Orientation: Left    Objective   Cognition:  Overall Cognitive Status: Within Functional Limits           Home Living:  Type of Home: House  Lives With: Spouse  Home Adaptive Equipment: Walker rolling or standard  Home Layout: Two level, 1/2 bath on main level  Home Access: Stairs to enter without rails  Entrance Stairs-Rails: None  Entrance Stairs-Number of Steps: 1  Bathroom Shower/Tub: Tub/shower unit  Bathroom Toilet: Standard  Bathroom Equipment: None  Home Living Comments: pt states will stay on first floor of home upon d/c  Prior Function:  Level of [place]: Independent with ADLs and functional transfers, Independent with homemaking with ambulation  Receives Help From: Family  ADL Assistance: Independent  Homemaking Assistance: Independent  Driving/Transportation: Independent  Ambulatory Assistance: Independent  Vocational: Full time employment ()  Hand Dominance: Right  Prior Function Comments: denies h/o falls       ADL:  Eating Assistance: Independent  Grooming Assistance:  (set up)  Bathing Assistance: Minimal  UE Dressing Assistance:  (set up)  LE Dressing Assistance: Minimal  Toileting Assistance with Device:  (close supervision)  Activities of Daily Living:      LE Dressing  LE Dressing: Yes  Pants Level of Assistance: Setup  LE Dressing Where Assessed: Edge of bed  LE Dressing Comments: pt able to don pants post set up while maintaining TKA precautions  Pt denied reviewing AE, reported he's been through many knee surgeries.      Bed Mobility/Transfers: Bed Mobility  Bed Mobility:  (min A for assist with LLE advancement supine>seated EOB with use of bed rail and HOB elevated)    Transfers  Transfer:  (min A for balance and controlled descent  sit<>stand bed and chair level; VCs for safe hand and leg placement)    Tub Transfers  Tub Transfers Comments: reviewed tub transfer technique    Therapy/Activity: Therapeutic Activity  Therapeutic  Activity Performed:  (min A for balance for functional mobility extended household distance with use of RW)    Sensation:  Sensation Comment: pt denied numbness/paresthesia BUE  Strength:  Strength Comments: WFL    Hand Function:  Hand Function  Gross Grasp: Functional  Coordination: Functional  Extremities: RUE   RUE : Within Functional Limits and LUE   LUE: Within Functional Limits    Outcome Measures: [facility] Daily Activity  Putting on and taking off regular lower body clothing: A little  Bathing (including washing, rinsing, drying): A little  Putting on and taking off regular upper body clothing: None  Toileting, which includes using toilet, bedpan or urinal: A little  Taking care of personal grooming such as brushing teeth: A little  Eating Meals: None  Daily Activity - Total Score: 20      Education Documentation  Handouts, taught by Reanna Corral OT at 12/13/2023 10:43 AM.  Learner: Patient  Readiness: Acceptance  Method: Demonstration, Handout, Explanation  Response: Verbalizes Understanding, Needs Reinforcement    Body Mechanics, taught by Reanna Corral OT at 12/13/2023 10:43 AM.  Learner: Patient  Readiness: Acceptance  Method: Demonstration, Handout, Explanation  Response: Verbalizes Understanding, Needs Reinforcement    Precautions, taught by Reanna Corral OT at 12/13/2023 10:43 AM.  Learner: Patient  Readiness: Acceptance  Method: Demonstration, Handout, Explanation  Response: Verbalizes Understanding, Needs Reinforcement    ADL Training, taught by Reanna Corral OT at 12/13/2023 10:43 AM.  Learner: Patient  Readiness: Acceptance  Method: Demonstration, Handout, Explanation  Response: Verbalizes Understanding, Needs Reinforcement    Education Comments  No comments found.        Goals:  Encounter Problems       Encounter Problems (Active)       ADLs       Pt will complete ADL tasks at mod I with use of AE prn  (Progressing)       Start:  12/13/23     Expected End:  12/27/23               Instrumental Activities of Daily Living       Pt will perform simple IADLs/retrieval of items at mod I.   (Progressing)       Start:  12/13/23    Expected End:  12/27/23                 Mobility       Pt will perform functional mobility household distances at mod I with use of RW.    (Progressing)       Start:  12/13/23    Expected End:  12/27/23               Precautions       Pt will independently maintain TKA precautions while completing ADL/IADL tasks and functional transfers/mobility.  (Progressing)       Start:  12/13/23    Expected End:  12/27/23               Transfers       Pt will perform functional transfers at mod I. (Progressing)       Start:  12/13/23    Expected End:  12/27/23

## 2023-12-13 NOTE — CONSULTS
Inpatient consult to Medicine  Consult performed by: MASSIMO Pope-CNP  Consult ordered by: Marjorie Narvaez PA-C  Reason for consult: HLD, murmur          Reason For Consult  Medical management of HLD, murmur    History Of Present Illness  Lorenzo Peraza is a 63 y.o. male with a past medical history of a heart murmur that has been present since he was 16 years old, hyperlipidemia that he states he has also been managing with his diet since he was young, and osteoarthritis who presented to [facility] for an anticipated left total knee arthroplasty.  He did report progressive left knee pain for a year that has become progressively worse over the last few months.  Noted worsening symptoms with ambulation.  Failed conservative treatments.  Denies chest pain, shortness of breath, fevers, chills, nausea, or vomiting. No abdominal discomfort. Denies lightheadedness or dizziness. No dysuria.  Patient underwent left total knee arthroplasty by Dr. Ramirez on 12/12/2023.  Medicine was consulted to manage any acute on chronic medical conditions.     Past Medical History  He has a past medical history of Body mass index (BMI) 26.0-26.9, adult (05/14/2021), Heart murmur, Hyperlipidemia, and Osteoarthritis.    Surgical History  He has a past surgical history that includes Knee surgery (07/18/2017); CT angio head w and wo IV contrast (06/22/2021); XR knee (2017); XR knee (Bilateral); Colonoscopy; Knee Arthroplasty (Right, 02/2023); and Tonsillectomy.     Social History  He reports that he has never smoked. He has never been exposed to tobacco smoke. He has never used smokeless tobacco. He reports that he does not currently use alcohol. He reports that he does not use drugs.    Family History  Family History   Problem Relation Name Age of Onset    Liver cancer Mother      Colon cancer Mother      Diabetes Mother      Lung disease Father      No Known Problems Sister      No Known Problems Sister      No Known Problems  Sister      No Known Problems Brother      No Known Problems Daughter      No Known Problems Daughter      No Known Problems Son          Allergies  Patient has no known allergies.    Review of Systems  As per HPI.  At least 10 systems reviewed and are otherwise negative.     Physical Exam  General: Well developed. NAD.  HEENT: PERRL. EOMI. Pink conjunctiva. Trachea midline.  Cardiovascular: S1, S2. RRR. No edema.  Respiratory: Breath sounds clear bilaterally, posterior anterior chest. No cyanosis. No hypoxia.  GI: Abdomen soft, nontender. Bowel sounds present in all quadrants .   : No bladder distention.  MS: Left surgical knee  Neuro: Alert, oriented. No numbness or paresthesia.   Skin: Warm, dry. Surgical dressing intact.          Last Recorded Vitals  /57 (BP Location: Right arm, Patient Position: Lying)   Pulse 58   Temp 37 °C (98.6 °F) (Oral)   Resp 16   Wt 86.2 kg (190 lb)   SpO2 96%     Relevant Results  Lab Results   Component Value Date    GLUCOSE 140 (H) 12/13/2023    CALCIUM 8.8 12/13/2023     12/13/2023    K 4.4 12/13/2023    CO2 25 12/13/2023     12/13/2023    BUN 16 12/13/2023    CREATININE 1.20 12/13/2023     Lab Results   Component Value Date    WBC 9.2 12/13/2023    HGB 11.5 (L) 12/13/2023    HCT 34.5 (L) 12/13/2023    MCV 92 12/13/2023     12/13/2023          Assessment/Plan     Hyperlipidemia  Managed with diet at home  Recommend low-cholesterol diet    Left knee osteoarthritis  S/p left total knee arthroplasty by Dr. Ramirez on 12/12/23  Pain management  Bowel regimen  Incentive spirometer  PT/OT  Management per orthopedic surgery    Normocytic Anemia  Mild decrease in H/H 11.5/34.5  Iron studies ordered  Continue ferrous sulfate  No signs/symptoms of bleeding    Heart murmur  Chronic, stable    Plan  Labs reviewed. Recommend follow up with PCP outpatient. Patient is medically stable. Discharge per orthopedic surgery.    Thank you so much for this consult. Will  continue to follow as needed.             Mora Peck, APRN-CNP

## 2023-12-14 ENCOUNTER — HOME CARE VISIT (OUTPATIENT)
Dept: HOME HEALTH SERVICES | Facility: HOME HEALTH | Age: 63
End: 2023-12-14
Payer: COMMERCIAL

## 2023-12-14 VITALS
HEART RATE: 76 BPM | DIASTOLIC BLOOD PRESSURE: 74 MMHG | TEMPERATURE: 98.2 F | SYSTOLIC BLOOD PRESSURE: 134 MMHG | BODY MASS INDEX: 25.18 KG/M2 | HEIGHT: 73 IN | RESPIRATION RATE: 18 BRPM | WEIGHT: 190 LBS

## 2023-12-14 PROCEDURE — 0023 HH SOC

## 2023-12-14 PROCEDURE — G0151 HHCP-SERV OF PT,EA 15 MIN: HCPCS

## 2023-12-14 SDOH — HEALTH STABILITY: PHYSICAL HEALTH: EXERCISE TYPE: LE THER EX

## 2023-12-14 ASSESSMENT — BALANCE ASSESSMENTS
NUDGED: 1 - STAGGERS, GRABS, CATCHES SELF
SITTING DOWN: 1 - USES ARMS OR NOT SMOOTH MOTION
EYES CLOSED AT MAXIMUM POSITION NUDGED: 1 - STEADY
NUDGED SCORE: 1
BALANCE SCORE: 9
SITTING BALANCE: 1 - STEADY, SAFE
ARISES: 1 - ABLE, USES ARMS TO HELP
STANDING BALANCE: 2 - NARROW STANCE WITHOUT SUPPORT
IMMEDIATE STANDING BALANCE FIRST 5 SECONDS: 1 - STEADY BUT USES WALKER OR OTHER SUPPORT
ARISING SCORE: 1
TURNING 360 DEGREES STEPS: 0 - DISCONTINUOUS STEPS
ATTEMPTS TO ARISE: 1 - ABLE, REQUIRES MORE THAN ONE ATTEMPT

## 2023-12-14 ASSESSMENT — GAIT ASSESSMENTS
TRUNK SCORE: 0
STEP SYMMETRY: 1 - RIGHT AND LEFT STEP LENGTH APPEAR EQUAL
INITIATION OF GAIT IMMEDIATELY AFTER GO: 1 - NO HESITANCY
BALANCE AND GAIT SCORE: 18
PATH: 1 - MILD/MODERATE DEVIATION OR USES WALKING AID
TRUNK: 0 - MARKED SWAY OR USES WALKING AID
WALKING STANCE: 1 - HEELS ALMOST TOUCHING WHILE WALKING
GAIT SCORE: 9
PATH SCORE: 1
STEP CONTINUITY: 1 - STEPS APPEAR CONTINUOUS

## 2023-12-14 ASSESSMENT — ENCOUNTER SYMPTOMS
PAIN LOCATION: LEFT KNEE
LOWEST PAIN SEVERITY IN PAST 24 HOURS: 4/10
PAIN LOCATION - PAIN SEVERITY: 8/10
HIGHEST PAIN SEVERITY IN PAST 24 HOURS: 10/10
PERSON REPORTING PAIN: PATIENT
PAIN: 1
MUSCLE WEAKNESS: 1
SUBJECTIVE PAIN PROGRESSION: UNCHANGED
LIMITED RANGE OF MOTION: 1
PAIN SEVERITY GOAL: 0/10

## 2023-12-14 ASSESSMENT — ACTIVITIES OF DAILY LIVING (ADL)
AMBULATION_DISTANCE/DURATION_TOLERATED: 150 FT
PHYSICAL TRANSFERS ASSESSED: 1
ENTERING_EXITING_HOME: NEEDS ASSISTANCE
AMBULATION ASSISTANCE: STAND BY ASSIST
DRESSING_UB_CURRENT_FUNCTION: INDEPENDENT
DRESSING_LB_CURRENT_FUNCTION: MINIMUM ASSIST
OASIS_M1830: 03
CURRENT_FUNCTION: STAND BY ASSIST
AMBULATION ASSISTANCE: 1
AMBULATION ASSISTANCE ON FLAT SURFACES: 1

## 2023-12-19 ENCOUNTER — HOME CARE VISIT (OUTPATIENT)
Dept: HOME HEALTH SERVICES | Facility: HOME HEALTH | Age: 63
End: 2023-12-19
Payer: COMMERCIAL

## 2023-12-19 PROCEDURE — G0151 HHCP-SERV OF PT,EA 15 MIN: HCPCS

## 2023-12-19 ASSESSMENT — ENCOUNTER SYMPTOMS
PAIN LOCATION - RELIEVING FACTORS: REST, ICE
PAIN LOCATION - PAIN QUALITY: ACHING
PAIN LOCATION: LEFT KNEE
PAIN: 1
PAIN LOCATION - PAIN SEVERITY: 4/10
PAIN LOCATION - EXACERBATING FACTORS: UPRIGHT ACTIVITY
PERSON REPORTING PAIN: PATIENT
HIGHEST PAIN SEVERITY IN PAST 24 HOURS: 4/10
PAIN LOCATION - PAIN FREQUENCY: CONSTANT
LOWEST PAIN SEVERITY IN PAST 24 HOURS: 3/10
PAIN SEVERITY GOAL: 0/10
SUBJECTIVE PAIN PROGRESSION: GRADUALLY IMPROVING
PAIN LOCATION - PAIN DURATION: CONSTANT

## 2023-12-19 NOTE — HOME HEALTH
7 days post op.  Dr. Dutch Ramirez 1 5 24.  Swelling.  Hard time keeping it down.  Right one done in 2 14 23.  Same surgeon.  No outpatient P.T. planned this time.  He did not have outpatient P.T. then.    TUG 26 seconds.  AROM flexion.  85 degrees  AROM extension   zero degrees    Full review of written home exercise program.  Those consisted of  1.  Ankle pumps  2.  Quad sets  3.  Gluteal sets  4.  Heel slides  5.  Hip abduction  6.  Straight leg raises  7.  Short arc quads  8.  Seated knee flexion, 10 x, holding 5 seconds.        for up to 20 reps unless otherwise noted.

## 2023-12-21 ENCOUNTER — HOME CARE VISIT (OUTPATIENT)
Dept: HOME HEALTH SERVICES | Facility: HOME HEALTH | Age: 63
End: 2023-12-21
Payer: COMMERCIAL

## 2023-12-21 PROCEDURE — G0151 HHCP-SERV OF PT,EA 15 MIN: HCPCS

## 2023-12-21 SDOH — HEALTH STABILITY: PHYSICAL HEALTH
EXERCISE COMMENTS: HIP ABDUCTION   6. STRAIGHT LEG RAISES   7. SHORT ARC QUADS   8. SEATED KNEE FLEXION, 10 X, HOLDING 5 SECONDS.   FOR UP TO 20 REPS UNLESS OTHERWISE NOTED.

## 2023-12-21 SDOH — HEALTH STABILITY: PHYSICAL HEALTH

## 2023-12-21 SDOH — HEALTH STABILITY: PHYSICAL HEALTH
EXERCISE COMMENTS: DID NOT HAVE OUTPATIENT P.T. THEN.     TUG 15 SECONDS. AROM FLEXION. 85 DEGREES AROM EXTENSION ZERO DEGREES     FULL REVIEW OF WRITTEN HOME EXERCISE PROGRAM.   THOSE CONSISTED OF   1. ANKLE PUMPS   2. QUAD SETS   3. GLUTEAL SETS   4. HEEL SLIDES   5.

## 2023-12-21 ASSESSMENT — ENCOUNTER SYMPTOMS
PAIN LOCATION - PAIN SEVERITY: 2/10
PAIN LOCATION: LEFT KNEE
PAIN: 1
LOWEST PAIN SEVERITY IN PAST 24 HOURS: 2/10
PERSON REPORTING PAIN: PATIENT
SUBJECTIVE PAIN PROGRESSION: GRADUALLY IMPROVING
HIGHEST PAIN SEVERITY IN PAST 24 HOURS: 8/10
PAIN SEVERITY GOAL: 0/10
LIMITED RANGE OF MOTION: 1
MUSCLE WEAKNESS: 1
CONTUSION: 1

## 2023-12-21 ASSESSMENT — ACTIVITIES OF DAILY LIVING (ADL)
AMBULATION ASSISTANCE: STAND BY ASSIST
AMBULATION_DISTANCE/DURATION_TOLERATED: 150
CURRENT_FUNCTION: ONE PERSON

## 2023-12-21 NOTE — HOME HEALTH
9 days post op. Dr. Dutch Ramirez 1 5 24. No news.  No falls.  Doing the same stuff.  Swelling still an obstacle.  A lot less pain my knee than I did before surgery. Right one done in 2 14 23. Same surgeon. No outpatient P.T. planned this time. He did not have outpatient P.T. then.     TUG 15 seconds. AROM flexion. 85 degrees AROM extension zero degrees     Full review of written home exercise program.   Those consisted of   1. Ankle pumps   2. Quad sets   3. Gluteal sets   4. Heel slides   5. Hip abduction   6. Straight leg raises   7. Short arc quads   8. Seated knee flexion, 10 x, holding 5 seconds.   for up to 20 reps unless otherwise noted.    Walking with single crutch 250 feet within the house, 3 plus 11 steps between first and second floors with handrail near the upper set of steps only, on the left ascending rail.   Good technique for stair climbing.

## 2023-12-26 ENCOUNTER — HOME CARE VISIT (OUTPATIENT)
Dept: HOME HEALTH SERVICES | Facility: HOME HEALTH | Age: 63
End: 2023-12-26
Payer: COMMERCIAL

## 2023-12-26 PROCEDURE — G0180 MD CERTIFICATION HHA PATIENT: HCPCS | Performed by: ORTHOPAEDIC SURGERY

## 2023-12-26 PROCEDURE — G0151 HHCP-SERV OF PT,EA 15 MIN: HCPCS

## 2023-12-26 ASSESSMENT — ACTIVITIES OF DAILY LIVING (ADL)
AMBULATION ASSISTANCE: 1
AMBULATION ASSISTANCE: STAND BY ASSIST

## 2023-12-26 ASSESSMENT — ENCOUNTER SYMPTOMS
PAIN LOCATION: LEFT KNEE
PAIN LOCATION - PAIN QUALITY: ACHING
LOWEST PAIN SEVERITY IN PAST 24 HOURS: 1/10
PAIN LOCATION - PAIN SEVERITY: 2/10
PAIN LOCATION - PAIN FREQUENCY: CONSTANT
PAIN SEVERITY GOAL: 0/10
PAIN: 1
PAIN LOCATION - PAIN DURATION: CONSTANT
SUBJECTIVE PAIN PROGRESSION: GRADUALLY IMPROVING
HIGHEST PAIN SEVERITY IN PAST 24 HOURS: 10/10

## 2023-12-26 NOTE — HOME HEALTH
14 days post op. Dr. Dutch Ramirez 1 5 24. Yesterday was bad.  Like a nerve pain. Medial left knee.  Ice, rest, tylenol.  NO falls, did the usual stuff and symptoms improve.    Doing the same stuff. Swelling still an obstacle. A lot less pain my knee than I did before surgery. Right one done in 2 14 23. Same surgeon. No outpatient P.T. planned this time. He did not have outpatient P.T. then. TUG 18 seconds.     AROM flexion. 100 degrees AROM extension minus 3 degrees     Full review of written home exercise program.     Those consisted of   1. Ankle pumps   2. Quad sets   deleted 3. Gluteal sets   4. Heel slides   5. Hip abduction   6. Straight leg raises   deleted 7. Short arc quads   8. Seated knee flexion, 10 x, holding 5 seconds. for up to 20 reps unless otherwise noted.  today added  9.  Seated full arc quads  10.  Standing hip extension  11.  Standing hip abduction  12.  Standing knee flexion  13.  Standing Toe Raises     Walking with single crutch 250 feet within the house, 3 plus 11 steps between first and second floors with handrail near the upper set of steps only, on the left ascending rail. Good technique for stair climbing.

## 2023-12-27 ENCOUNTER — APPOINTMENT (OUTPATIENT)
Dept: HOME HEALTH SERVICES | Facility: HOME HEALTH | Age: 63
End: 2023-12-27
Payer: COMMERCIAL

## 2023-12-29 ENCOUNTER — ANCILLARY PROCEDURE (OUTPATIENT)
Dept: RADIOLOGY | Facility: CLINIC | Age: 63
End: 2023-12-29
Payer: COMMERCIAL

## 2023-12-29 DIAGNOSIS — Z96.659 TOTAL KNEE REPLACEMENT STATUS: ICD-10-CM

## 2023-12-29 PROCEDURE — 73560 X-RAY EXAM OF KNEE 1 OR 2: CPT | Mod: LEFT SIDE | Performed by: RADIOLOGY

## 2023-12-29 PROCEDURE — 73560 X-RAY EXAM OF KNEE 1 OR 2: CPT | Mod: LT

## 2024-01-04 ENCOUNTER — HOME CARE VISIT (OUTPATIENT)
Dept: HOME HEALTH SERVICES | Facility: HOME HEALTH | Age: 64
End: 2024-01-04
Payer: COMMERCIAL

## 2024-01-04 PROCEDURE — G0151 HHCP-SERV OF PT,EA 15 MIN: HCPCS

## 2024-01-04 SDOH — HEALTH STABILITY: PHYSICAL HEALTH
EXERCISE COMMENTS: D THIS TIME. HE DID NOT HAVE OUTPATIENT P.T. THEN. TUG 18 SECONDS. AROM FLEXION. 107 DEGREES AROM EXTENSION MINUS 2 DEGREES   FULL REVIEW OF WRITTEN HOME EXERCISE PROGRAM. THOSE CONSISTED OF   1. ANKLE PUMPS   2. QUAD SETS   4. HEEL SLIDES   5. HIP A

## 2024-01-04 SDOH — HEALTH STABILITY: PHYSICAL HEALTH
EXERCISE COMMENTS: BDUCTION IN SIDELYING  6. STRAIGHT LEG RAISES  8. SEATED KNEE FLEXION  9. SEATED FULL ARC QUADS   10. STANDING HIP EXTENSION   11. STANDING HIP ABDUCTION   12. STANDING KNEE FLEXION   13. STANDING TOE RAISES   10 X, HOLDING 5 SECONDS. FOR UP TO 20 RE

## 2024-01-04 SDOH — HEALTH STABILITY: PHYSICAL HEALTH
EXERCISE COMMENTS: PS UNLESS OTHERWISE NOTED.   WALKING WITH SINGLE CRUTCH 250 FEET WITHIN THE HOUSE, 3 PLUS 11 STEPS BETWEEN FIRST AND SECOND FLOORS WITH HANDRAIL NEAR THE UPPER SET OF STEPS ONLY, ON THE LEFT ASCENDING RAIL. GOOD TECHNIQUE FOR STAIR CLIMBING.

## 2024-01-04 SDOH — HEALTH STABILITY: PHYSICAL HEALTH

## 2024-01-04 ASSESSMENT — ENCOUNTER SYMPTOMS
PERSON REPORTING PAIN: PATIENT
PAIN LOCATION: LEFT KNEE
PAIN: 1
HIGHEST PAIN SEVERITY IN PAST 24 HOURS: 1/10
SUBJECTIVE PAIN PROGRESSION: GRADUALLY IMPROVING
PAIN LOCATION - PAIN SEVERITY: 0/10
LOSS OF SENSATION IN FEET: 0
PAIN SEVERITY GOAL: 0/10
PAIN LOCATION - PAIN QUALITY: STIFF
DEPRESSION: 0
OCCASIONAL FEELINGS OF UNSTEADINESS: 0
LOWEST PAIN SEVERITY IN PAST 24 HOURS: 0/10

## 2024-01-04 ASSESSMENT — ACTIVITIES OF DAILY LIVING (ADL)
AMBULATION_DISTANCE/DURATION_TOLERATED: 500
AMBULATION ASSISTANCE ON FLAT SURFACES: 1
AMBULATION ASSISTANCE: INDEPENDENT
AMBULATION ASSISTANCE: 1
HOME_HEALTH_OASIS: 00
OASIS_M1830: 00

## 2024-01-04 NOTE — HOME HEALTH
Patient does not prefer outpatient P.T.  Will see Dr. Dutch Ramirez tomorrow.  LIttle stiffness but improvement.  1 out o 10.    23 days post op. Dr. Dutch Ramirez 1 5 24.         Right one done in 2 14 23. Same surgeon. No outpatient P.T. planned this time. He did not have outpatient P.T. then. TUG 18 seconds. AROM flexion. 107 degrees AROM extension minus 2 degrees   Full review of written home exercise program. Those consisted of   1. Ankle pumps   2. Quad sets   4. Heel slides   5. Hip abduction in sidelying  6. Straight leg raises  8. Seated knee flexion  9. Seated full arc quads   10. Standing hip extension   11. Standing hip abduction   12. Standing knee flexion   13. Standing Toe Raises   10 x, holding 5 seconds. for up to 20 reps unless otherwise noted.   Walking with single crutch 250 feet within the house, 3 plus 11 steps between first and second floors with handrail near the upper set of steps only, on the left ascending rail. Good technique for stair climbing.

## 2024-01-05 ENCOUNTER — OFFICE VISIT (OUTPATIENT)
Dept: ORTHOPEDIC SURGERY | Facility: CLINIC | Age: 64
End: 2024-01-05
Payer: COMMERCIAL

## 2024-01-05 VITALS — WEIGHT: 190.04 LBS | BODY MASS INDEX: 25.07 KG/M2

## 2024-01-05 DIAGNOSIS — Z96.652 HISTORY OF TOTAL KNEE ARTHROPLASTY, LEFT: ICD-10-CM

## 2024-01-05 DIAGNOSIS — M25.562 CHRONIC PAIN OF LEFT KNEE: Primary | ICD-10-CM

## 2024-01-05 DIAGNOSIS — G89.29 CHRONIC PAIN OF LEFT KNEE: Primary | ICD-10-CM

## 2024-01-05 PROCEDURE — 99024 POSTOP FOLLOW-UP VISIT: CPT | Performed by: PHYSICIAN ASSISTANT

## 2024-01-05 PROCEDURE — 1036F TOBACCO NON-USER: CPT | Performed by: PHYSICIAN ASSISTANT

## 2024-01-05 ASSESSMENT — PAIN SCALES - GENERAL: PAINLEVEL_OUTOF10: 1

## 2024-01-05 ASSESSMENT — ENCOUNTER SYMPTOMS
DEPRESSION: 0
LOSS OF SENSATION IN FEET: 0
OCCASIONAL FEELINGS OF UNSTEADINESS: 0

## 2024-01-05 ASSESSMENT — PATIENT HEALTH QUESTIONNAIRE - PHQ9
1. LITTLE INTEREST OR PLEASURE IN DOING THINGS: NOT AT ALL
SUM OF ALL RESPONSES TO PHQ9 QUESTIONS 1 AND 2: 0
2. FEELING DOWN, DEPRESSED OR HOPELESS: NOT AT ALL

## 2024-01-05 ASSESSMENT — LIFESTYLE VARIABLES: TOTAL SCORE: 0

## 2024-01-05 ASSESSMENT — PAIN DESCRIPTION - DESCRIPTORS: DESCRIPTORS: ACHING

## 2024-01-05 NOTE — PATIENT INSTRUCTIONS
Continue to rest, ice and elevate your knee.  Continue your pain regimen  Remember to call our office for antibiotics before dental work.   Use a cane or walker for support.   Do not kneel, twist, or squat and avoid heavy lifting.     Return in 4 weeks or sooner as needed.

## 2024-01-31 PROBLEM — K44.9 DIAPHRAGMATIC HERNIA: Status: ACTIVE | Noted: 2022-12-13

## 2024-01-31 PROBLEM — M54.50 LOW BACK PAIN: Status: ACTIVE | Noted: 2024-01-31

## 2024-01-31 PROBLEM — M65.311 TRIGGER FINGER OF RIGHT THUMB: Status: ACTIVE | Noted: 2024-01-31

## 2024-01-31 PROBLEM — M75.52 BURSITIS OF LEFT SHOULDER: Status: ACTIVE | Noted: 2024-01-31

## 2024-01-31 PROBLEM — R50.9 FEVER: Status: ACTIVE | Noted: 2024-01-31

## 2024-01-31 PROBLEM — M62.89 MUSCLE HYPERTROPHY: Status: ACTIVE | Noted: 2023-07-17

## 2024-01-31 PROBLEM — H52.03 HYPEROPIA OF BOTH EYES: Status: ACTIVE | Noted: 2024-01-31

## 2024-01-31 PROBLEM — M75.42 IMPINGEMENT SYNDROME OF LEFT SHOULDER: Status: ACTIVE | Noted: 2024-01-31

## 2024-01-31 PROBLEM — R51.9 FREQUENT HEADACHES: Status: ACTIVE | Noted: 2024-01-31

## 2024-01-31 PROBLEM — M65.312 TRIGGER FINGER OF LEFT THUMB: Status: ACTIVE | Noted: 2024-01-31

## 2024-01-31 PROBLEM — Z96.651 HISTORY OF TOTAL RIGHT KNEE REPLACEMENT: Status: ACTIVE | Noted: 2024-01-05

## 2024-01-31 PROBLEM — R42 LIGHTHEADEDNESS: Status: ACTIVE | Noted: 2024-01-31

## 2024-01-31 PROBLEM — R09.81 NASAL CONGESTION: Status: ACTIVE | Noted: 2024-01-31

## 2024-01-31 PROBLEM — D22.62 MELANOCYTIC NEVI OF LEFT UPPER LIMB, INCLUDING SHOULDER: Status: ACTIVE | Noted: 2022-03-22

## 2024-01-31 PROBLEM — F41.9 ANXIETY: Status: ACTIVE | Noted: 2024-01-31

## 2024-01-31 PROBLEM — M25.561 PAIN IN RIGHT KNEE: Status: ACTIVE | Noted: 2023-01-13

## 2024-01-31 PROBLEM — M25.539 PAIN IN WRIST: Status: ACTIVE | Noted: 2024-01-31

## 2024-01-31 PROBLEM — H43.399 VITREOUS FLOATERS: Status: ACTIVE | Noted: 2024-01-31

## 2024-01-31 PROBLEM — L82.0 INFLAMED SEBORRHEIC KERATOSIS: Status: ACTIVE | Noted: 2022-03-22

## 2024-01-31 PROBLEM — L98.9 SKIN LESION: Status: ACTIVE | Noted: 2020-09-11

## 2024-01-31 PROBLEM — D18.00 HEMANGIOMA: Status: ACTIVE | Noted: 2023-07-17

## 2024-01-31 PROBLEM — G56.00 CARPAL TUNNEL SYNDROME: Status: ACTIVE | Noted: 2024-01-31

## 2024-01-31 PROBLEM — R07.89 ATYPICAL CHEST PAIN: Status: ACTIVE | Noted: 2024-01-31

## 2024-01-31 PROBLEM — K21.9 GASTROESOPHAGEAL REFLUX DISEASE: Status: ACTIVE | Noted: 2022-12-13

## 2024-01-31 PROBLEM — M17.9 OSTEOARTHRITIS OF KNEE: Status: ACTIVE | Noted: 2023-02-14

## 2024-01-31 PROBLEM — M23.90 KNEE LOCKING: Status: ACTIVE | Noted: 2023-04-06

## 2024-01-31 PROBLEM — B35.1 ONYCHOMYCOSIS DUE TO DERMATOPHYTE: Status: ACTIVE | Noted: 2022-03-22

## 2024-01-31 PROBLEM — S83.90XA SPRAIN OF KNEE: Status: ACTIVE | Noted: 2024-01-31

## 2024-01-31 PROBLEM — R10.31 RIGHT INGUINAL PAIN: Status: ACTIVE | Noted: 2024-01-31

## 2024-01-31 PROBLEM — R53.83 FATIGUE: Status: ACTIVE | Noted: 2024-01-31

## 2024-01-31 PROBLEM — M25.361 INSTABILITY OF RIGHT KNEE JOINT: Status: ACTIVE | Noted: 2024-01-31

## 2024-01-31 RX ORDER — ESOMEPRAZOLE MAGNESIUM 40 MG/1
CAPSULE, DELAYED RELEASE ORAL
COMMUNITY
Start: 2022-11-29 | End: 2024-02-02 | Stop reason: ALTCHOICE

## 2024-01-31 RX ORDER — NYSTATIN AND TRIAMCINOLONE ACETONIDE 100000; 1 [USP'U]/G; MG/G
CREAM TOPICAL EVERY 12 HOURS
COMMUNITY
Start: 2022-01-19 | End: 2024-02-02 | Stop reason: ALTCHOICE

## 2024-01-31 RX ORDER — OXYCODONE AND ACETAMINOPHEN 5; 325 MG/1; MG/1
TABLET ORAL EVERY 6 HOURS
COMMUNITY
End: 2024-02-02 | Stop reason: ALTCHOICE

## 2024-01-31 RX ORDER — OMEPRAZOLE 40 MG/1
CAPSULE, DELAYED RELEASE ORAL
COMMUNITY
Start: 2022-10-17 | End: 2024-02-02 | Stop reason: ALTCHOICE

## 2024-01-31 RX ORDER — ACETAMINOPHEN 500 MG
TABLET ORAL EVERY 6 HOURS
COMMUNITY
End: 2024-02-02 | Stop reason: ALTCHOICE

## 2024-01-31 RX ORDER — METHYLPREDNISOLONE 4 MG/1
TABLET ORAL
COMMUNITY
Start: 2018-08-10 | End: 2024-02-02 | Stop reason: ALTCHOICE

## 2024-01-31 RX ORDER — FLUTICASONE PROPIONATE 50 MCG
SPRAY, SUSPENSION (ML) NASAL
COMMUNITY
Start: 2017-07-18 | End: 2024-02-02 | Stop reason: ALTCHOICE

## 2024-01-31 RX ORDER — FERROUS SULFATE 325(65) MG
2 TABLET ORAL
COMMUNITY
Start: 2023-12-13 | End: 2024-02-02 | Stop reason: ALTCHOICE

## 2024-01-31 RX ORDER — ONDANSETRON 4 MG/1
4 TABLET, FILM COATED ORAL EVERY 8 HOURS PRN
COMMUNITY
Start: 2023-12-12 | End: 2024-02-02 | Stop reason: ALTCHOICE

## 2024-01-31 RX ORDER — ACETAMINOPHEN 325 MG/1
TABLET ORAL 4 TIMES DAILY PRN
COMMUNITY
Start: 2023-12-12 | End: 2024-02-02 | Stop reason: ALTCHOICE

## 2024-01-31 RX ORDER — AMOXICILLIN 500 MG/1
CAPSULE ORAL
COMMUNITY
Start: 2023-04-25 | End: 2024-02-02 | Stop reason: ALTCHOICE

## 2024-01-31 RX ORDER — FAMOTIDINE 40 MG/1
TABLET, FILM COATED ORAL
COMMUNITY
Start: 2022-12-16 | End: 2024-02-02 | Stop reason: ALTCHOICE

## 2024-01-31 RX ORDER — IBUPROFEN 200 MG
TABLET ORAL EVERY 8 HOURS
COMMUNITY
End: 2024-02-02 | Stop reason: ALTCHOICE

## 2024-01-31 RX ORDER — POLYETHYLENE GLYCOL 3350 17 G/17G
17 POWDER, FOR SOLUTION ORAL
COMMUNITY
Start: 2023-12-12 | End: 2024-02-02 | Stop reason: ALTCHOICE

## 2024-02-02 ENCOUNTER — OFFICE VISIT (OUTPATIENT)
Dept: ORTHOPEDIC SURGERY | Facility: CLINIC | Age: 64
End: 2024-02-02
Payer: COMMERCIAL

## 2024-02-02 ENCOUNTER — HOSPITAL ENCOUNTER (OUTPATIENT)
Dept: RADIOLOGY | Facility: CLINIC | Age: 64
Discharge: HOME | End: 2024-02-02
Payer: COMMERCIAL

## 2024-02-02 VITALS — BODY MASS INDEX: 25.07 KG/M2 | WEIGHT: 190.04 LBS

## 2024-02-02 DIAGNOSIS — Z96.659 STATUS POST KNEE REPLACEMENT: ICD-10-CM

## 2024-02-02 DIAGNOSIS — G89.29 CHRONIC PAIN OF LEFT KNEE: Primary | ICD-10-CM

## 2024-02-02 DIAGNOSIS — Z96.652 HISTORY OF TOTAL KNEE ARTHROPLASTY, LEFT: ICD-10-CM

## 2024-02-02 DIAGNOSIS — M25.562 CHRONIC PAIN OF LEFT KNEE: Primary | ICD-10-CM

## 2024-02-02 PROCEDURE — 73560 X-RAY EXAM OF KNEE 1 OR 2: CPT | Mod: LT

## 2024-02-02 PROCEDURE — 1036F TOBACCO NON-USER: CPT | Performed by: PHYSICIAN ASSISTANT

## 2024-02-02 PROCEDURE — 99024 POSTOP FOLLOW-UP VISIT: CPT | Performed by: PHYSICIAN ASSISTANT

## 2024-02-02 PROCEDURE — 73560 X-RAY EXAM OF KNEE 1 OR 2: CPT | Mod: LEFT SIDE | Performed by: ORTHOPAEDIC SURGERY

## 2024-02-02 ASSESSMENT — PATIENT HEALTH QUESTIONNAIRE - PHQ9
SUM OF ALL RESPONSES TO PHQ9 QUESTIONS 1 AND 2: 0
1. LITTLE INTEREST OR PLEASURE IN DOING THINGS: NOT AT ALL
2. FEELING DOWN, DEPRESSED OR HOPELESS: NOT AT ALL

## 2024-02-02 ASSESSMENT — PAIN - FUNCTIONAL ASSESSMENT: PAIN_FUNCTIONAL_ASSESSMENT: 0-10

## 2024-02-02 ASSESSMENT — ENCOUNTER SYMPTOMS
LOSS OF SENSATION IN FEET: 0
OCCASIONAL FEELINGS OF UNSTEADINESS: 0
DEPRESSION: 0

## 2024-02-02 ASSESSMENT — PAIN SCALES - GENERAL: PAINLEVEL_OUTOF10: 1

## 2024-02-02 ASSESSMENT — LIFESTYLE VARIABLES: TOTAL SCORE: 0

## 2024-02-02 ASSESSMENT — PAIN DESCRIPTION - DESCRIPTORS: DESCRIPTORS: ACHING

## 2024-02-02 NOTE — PROGRESS NOTES
Subjective      Past Surgical History:   Procedure Laterality Date    COLONOSCOPY      x 2    CT HEAD ANGIO W AND WO IV CONTRAST  06/22/2021    CT HEAD ANGIO W AND WO IV CONTRAST 6/22/2021 [facility]    KNEE  2017    KNEE Bilateral     scope    KNEE ARTHROPLASTY Right 02/2023    KNEE SURGERY  07/18/2017    Knee Surgery    KNEE SURGERY Left 12/12/2023    TONSILLECTOMY            Patient History      This patient is s/p left total knee replacement done by Dr. Langford on 12/12/2023. They present today and state that their left knee pain is 1/10. They are slowly resuming their normal activities of daily living. He is working with physical therapy. They deny chest pain, shortness of breath.     There were no vitals taken for this visit.     ROS  CARDIOLOGY:   Negative for chest pain, shortness of breath.   RESPIRATORY:   Negative for chest pain, shortness of breath.   MUSCULOSKELETAL:   See HPI for details.   NEUROLOGY:   Negative for tingling, numbness, weakness.      Physical exam: Left knee: Incision is clean dry and well healing. Patient has painless ROM from 0-120 degrees. Nontender in the left calf. Neurovascular is intact. The patient is seen today walking independently with a stable gait.     XR knee left 1-2 views    Result Date: 12/30/2023  Interpreted By:  Ovidio Radford, STUDY: XR KNEE LEFT 1-2 VIEWS;  12/29/2023 12:42 pm   INDICATION: Signs/Symptoms:TOTAL KNEE.   COMPARISON: 12/12/2023   ACCESSION NUMBER(S): [id number]   ORDERING CLINICIAN: SYL LANGFORD   FINDINGS: Bony structures:  Intact   Joint spaces:  Status post total knee arthroplasty as on the prior exam without hardware loosening. 2 tibial apophyseal staples are also present and also without loosening and similar to the prior exam.   Soft tissues:  Resolution of previous postoperative emphysema.   Other:  None significant       No acute findings.   MACRO: None   Signed by: Ovidio Radford 12/30/2023 7:38 PM Dictation workstation:    [id number]    XR knee left 1-2 views    Result Date: 12/12/2023  Interpreted By:  Oneyda Snow, STUDY: XR KNEE LEFT 1-2 VIEWS; ;  12/12/2023 11:49 am   INDICATION: Signs/Symptoms:Post-op knee.   COMPARISON: 10/06/2023.   ACCESSION NUMBER(S): [id number]   ORDERING CLINICIAN: SYL LANGFORD   FINDINGS: Post total left knee arthroplasty. Hardware appears intact. Postsurgical changes include edema/air about the left knee joint. No suspicious osseous findings.       Acceptable postsurgical changes from total left knee arthroplasty.     MACRO: None   Signed by: Oneyda Snow 12/12/2023 12:54 PM Dictation workstation:   [id number]    ECG 12 lead    Result Date: 12/6/2023  Sinus bradycardia with 1st degree AV block Otherwise normal ECG Confirmed by Lam Manley ([id number]) on 12/6/2023 9:03:50 PM      Lorenzo was seen today for post-op.  Diagnoses and all orders for this visit:  Chronic pain of left knee (Primary)  History of total knee arthroplasty, left  Options are discussed with the patient in detail. The patient is instructed regarding infection precautions, activity modification and risk for further injury with falling or trauma and to use a cane for support, ice, provider directed at home gentle strengthening and ROM exercises, and the appropriate use of Tylenol as needed for pain with its potential adverse reactions and side effects. The patient understands. Return in 6 weeks or sooner as needed. Please note that this report has been produced using speech recognition software.  It may contain errors related to grammar, punctuation or spelling.  Electronically signed, but not reviewed.

## 2024-02-02 NOTE — PATIENT INSTRUCTIONS
Continue to rest, ice and elevate your knee.  Continue your pain regimen  Remember to call our office for antibiotics before dental work.   Use a cane or walker for support.   Do not kneel, twist, or squat and avoid heavy lifting.     Return after 3- with Dr. Ramirez

## 2024-02-07 ENCOUNTER — APPOINTMENT (OUTPATIENT)
Dept: CARDIOLOGY | Facility: HOSPITAL | Age: 64
End: 2024-02-07
Payer: COMMERCIAL

## 2024-02-07 ENCOUNTER — HOSPITAL ENCOUNTER (OUTPATIENT)
Facility: HOSPITAL | Age: 64
Setting detail: OUTPATIENT SURGERY
End: 2024-02-07
Attending: SURGERY | Admitting: SURGERY
Payer: COMMERCIAL

## 2024-02-07 ENCOUNTER — APPOINTMENT (OUTPATIENT)
Dept: RADIOLOGY | Facility: HOSPITAL | Age: 64
End: 2024-02-07
Payer: COMMERCIAL

## 2024-02-07 ENCOUNTER — HOSPITAL ENCOUNTER (OUTPATIENT)
Facility: HOSPITAL | Age: 64
Setting detail: OBSERVATION
Discharge: HOME | End: 2024-02-08
Attending: EMERGENCY MEDICINE | Admitting: SURGERY
Payer: COMMERCIAL

## 2024-02-07 ENCOUNTER — ANESTHESIA (OUTPATIENT)
Dept: OPERATING ROOM | Facility: HOSPITAL | Age: 64
End: 2024-02-07
Payer: COMMERCIAL

## 2024-02-07 ENCOUNTER — ANESTHESIA EVENT (OUTPATIENT)
Dept: OPERATING ROOM | Facility: HOSPITAL | Age: 64
End: 2024-02-07
Payer: COMMERCIAL

## 2024-02-07 DIAGNOSIS — R07.89 OTHER CHEST PAIN: ICD-10-CM

## 2024-02-07 DIAGNOSIS — K80.01 CALCULUS OF GALLBLADDER WITH ACUTE CHOLECYSTITIS AND OBSTRUCTION: Primary | ICD-10-CM

## 2024-02-07 DIAGNOSIS — R79.89 ELEVATED D-DIMER: ICD-10-CM

## 2024-02-07 DIAGNOSIS — R74.8 ELEVATED LIVER ENZYMES: ICD-10-CM

## 2024-02-07 DIAGNOSIS — E80.6 HYPERBILIRUBINEMIA: ICD-10-CM

## 2024-02-07 LAB
ALBUMIN SERPL-MCNC: 4.2 G/DL (ref 3.5–5)
ALP BLD-CCNC: 144 U/L (ref 35–125)
ALT SERPL-CCNC: 308 U/L (ref 5–40)
ANION GAP SERPL CALC-SCNC: 10 MMOL/L
AST SERPL-CCNC: 201 U/L (ref 5–40)
ATRIAL RATE: 61 BPM
BILIRUB SERPL-MCNC: 1.5 MG/DL (ref 0.1–1.2)
BUN SERPL-MCNC: 17 MG/DL (ref 8–25)
CALCIUM SERPL-MCNC: 9.1 MG/DL (ref 8.5–10.4)
CHLORIDE SERPL-SCNC: 101 MMOL/L (ref 97–107)
CO2 SERPL-SCNC: 25 MMOL/L (ref 24–31)
CREAT SERPL-MCNC: 1.1 MG/DL (ref 0.4–1.6)
D DIMER PPP FEU-MCNC: 2.12 MG/L FEU (ref 0.19–0.5)
EGFRCR SERPLBLD CKD-EPI 2021: 76 ML/MIN/1.73M*2
ERYTHROCYTE [DISTWIDTH] IN BLOOD BY AUTOMATED COUNT: 12.8 % (ref 11.5–14.5)
GLUCOSE SERPL-MCNC: 140 MG/DL (ref 65–99)
HCT VFR BLD AUTO: 36.9 % (ref 41–52)
HGB BLD-MCNC: 11.9 G/DL (ref 13.5–17.5)
LIPASE SERPL-CCNC: 27 U/L (ref 16–63)
MCH RBC QN AUTO: 29.3 PG (ref 26–34)
MCHC RBC AUTO-ENTMCNC: 32.2 G/DL (ref 32–36)
MCV RBC AUTO: 91 FL (ref 80–100)
NRBC BLD-RTO: 0 /100 WBCS (ref 0–0)
NT-PROBNP SERPL-MCNC: 174 PG/ML (ref 0–177)
P AXIS: 70 DEGREES
P OFFSET: 186 MS
P ONSET: 137 MS
PLATELET # BLD AUTO: 198 X10*3/UL (ref 150–450)
POTASSIUM SERPL-SCNC: 3.9 MMOL/L (ref 3.4–5.1)
PR INTERVAL: 180 MS
PROT SERPL-MCNC: 6.8 G/DL (ref 5.9–7.9)
Q ONSET: 227 MS
QRS COUNT: 10 BEATS
QRS DURATION: 74 MS
QT INTERVAL: 426 MS
QTC CALCULATION(BAZETT): 428 MS
QTC FREDERICIA: 428 MS
R AXIS: 50 DEGREES
RBC # BLD AUTO: 4.06 X10*6/UL (ref 4.5–5.9)
SODIUM SERPL-SCNC: 136 MMOL/L (ref 133–145)
T AXIS: 70 DEGREES
T OFFSET: 440 MS
TROPONIN T SERPL-MCNC: 7 NG/L
VENTRICULAR RATE: 61 BPM
WBC # BLD AUTO: 5.3 X10*3/UL (ref 4.4–11.3)

## 2024-02-07 PROCEDURE — 80053 COMPREHEN METABOLIC PANEL: CPT | Performed by: NURSE PRACTITIONER

## 2024-02-07 PROCEDURE — 99223 1ST HOSP IP/OBS HIGH 75: CPT | Performed by: SURGERY

## 2024-02-07 PROCEDURE — 36415 COLL VENOUS BLD VENIPUNCTURE: CPT | Performed by: NURSE PRACTITIONER

## 2024-02-07 PROCEDURE — G0378 HOSPITAL OBSERVATION PER HR: HCPCS

## 2024-02-07 PROCEDURE — 2500000005 HC RX 250 GENERAL PHARMACY W/O HCPCS: Performed by: SURGERY

## 2024-02-07 PROCEDURE — 85027 COMPLETE CBC AUTOMATED: CPT | Performed by: NURSE PRACTITIONER

## 2024-02-07 PROCEDURE — 2500000004 HC RX 250 GENERAL PHARMACY W/ HCPCS (ALT 636 FOR OP/ED): Performed by: EMERGENCY MEDICINE

## 2024-02-07 PROCEDURE — 88304 TISSUE EXAM BY PATHOLOGIST: CPT | Mod: TC | Performed by: SURGERY

## 2024-02-07 PROCEDURE — 96372 THER/PROPH/DIAG INJ SC/IM: CPT

## 2024-02-07 PROCEDURE — 76000 FLUOROSCOPY <1 HR PHYS/QHP: CPT | Mod: 59

## 2024-02-07 PROCEDURE — 2500000004 HC RX 250 GENERAL PHARMACY W/ HCPCS (ALT 636 FOR OP/ED): Performed by: NURSE ANESTHETIST, CERTIFIED REGISTERED

## 2024-02-07 PROCEDURE — 96374 THER/PROPH/DIAG INJ IV PUSH: CPT | Mod: 59 | Performed by: SURGERY

## 2024-02-07 PROCEDURE — 71260 CT THORAX DX C+: CPT | Mod: FR

## 2024-02-07 PROCEDURE — 71260 CT THORAX DX C+: CPT | Mod: FOREIGN READ | Performed by: RADIOLOGY

## 2024-02-07 PROCEDURE — 84484 ASSAY OF TROPONIN QUANT: CPT | Performed by: NURSE PRACTITIONER

## 2024-02-07 PROCEDURE — 2500000005 HC RX 250 GENERAL PHARMACY W/O HCPCS: Performed by: NURSE ANESTHETIST, CERTIFIED REGISTERED

## 2024-02-07 PROCEDURE — A47564 PR LAP,CHOLECYSTECTOMY/EXPLORE: Performed by: NURSE ANESTHETIST, CERTIFIED REGISTERED

## 2024-02-07 PROCEDURE — 3600000003 HC OR TIME - INITIAL BASE CHARGE - PROCEDURE LEVEL THREE: Performed by: SURGERY

## 2024-02-07 PROCEDURE — 83690 ASSAY OF LIPASE: CPT | Performed by: EMERGENCY MEDICINE

## 2024-02-07 PROCEDURE — 7100000002 HC RECOVERY ROOM TIME - EACH INCREMENTAL 1 MINUTE: Performed by: SURGERY

## 2024-02-07 PROCEDURE — 3600000008 HC OR TIME - EACH INCREMENTAL 1 MINUTE - PROCEDURE LEVEL THREE: Performed by: SURGERY

## 2024-02-07 PROCEDURE — 96375 TX/PRO/DX INJ NEW DRUG ADDON: CPT | Mod: 59 | Performed by: SURGERY

## 2024-02-07 PROCEDURE — 2720000007 HC OR 272 NO HCPCS: Performed by: SURGERY

## 2024-02-07 PROCEDURE — 76705 ECHO EXAM OF ABDOMEN: CPT | Mod: FOREIGN READ | Performed by: RADIOLOGY

## 2024-02-07 PROCEDURE — 71045 X-RAY EXAM CHEST 1 VIEW: CPT | Mod: FOREIGN READ | Performed by: RADIOLOGY

## 2024-02-07 PROCEDURE — 2550000001 HC RX 255 CONTRASTS: Performed by: SURGERY

## 2024-02-07 PROCEDURE — 47564 LAPARO CHOLECYSTECTOMY/EXPLR: CPT | Performed by: SURGERY

## 2024-02-07 PROCEDURE — 93005 ELECTROCARDIOGRAM TRACING: CPT | Mod: 59

## 2024-02-07 PROCEDURE — 96361 HYDRATE IV INFUSION ADD-ON: CPT | Mod: 59 | Performed by: SURGERY

## 2024-02-07 PROCEDURE — 3700000002 HC GENERAL ANESTHESIA TIME - EACH INCREMENTAL 1 MINUTE: Performed by: SURGERY

## 2024-02-07 PROCEDURE — 99285 EMERGENCY DEPT VISIT HI MDM: CPT | Mod: 25 | Performed by: EMERGENCY MEDICINE

## 2024-02-07 PROCEDURE — 74177 CT ABD & PELVIS W/CONTRAST: CPT | Mod: FOREIGN READ | Performed by: RADIOLOGY

## 2024-02-07 PROCEDURE — 85300 ANTITHROMBIN III ACTIVITY: CPT | Performed by: NURSE PRACTITIONER

## 2024-02-07 PROCEDURE — 83880 ASSAY OF NATRIURETIC PEPTIDE: CPT | Performed by: NURSE PRACTITIONER

## 2024-02-07 PROCEDURE — 2500000004 HC RX 250 GENERAL PHARMACY W/ HCPCS (ALT 636 FOR OP/ED): Performed by: NURSE PRACTITIONER

## 2024-02-07 PROCEDURE — A47564 PR LAP,CHOLECYSTECTOMY/EXPLORE: Performed by: ANESTHESIOLOGY

## 2024-02-07 PROCEDURE — 2500000004 HC RX 250 GENERAL PHARMACY W/ HCPCS (ALT 636 FOR OP/ED): Performed by: SURGERY

## 2024-02-07 PROCEDURE — A4550 SURGICAL TRAYS: HCPCS | Performed by: SURGERY

## 2024-02-07 PROCEDURE — 3700000001 HC GENERAL ANESTHESIA TIME - INITIAL BASE CHARGE: Performed by: SURGERY

## 2024-02-07 PROCEDURE — 2500000004 HC RX 250 GENERAL PHARMACY W/ HCPCS (ALT 636 FOR OP/ED): Performed by: ANESTHESIOLOGY

## 2024-02-07 PROCEDURE — 7100000001 HC RECOVERY ROOM TIME - INITIAL BASE CHARGE: Performed by: SURGERY

## 2024-02-07 PROCEDURE — 76705 ECHO EXAM OF ABDOMEN: CPT

## 2024-02-07 PROCEDURE — 88304 TISSUE EXAM BY PATHOLOGIST: CPT | Performed by: PATHOLOGY

## 2024-02-07 PROCEDURE — 2500000001 HC RX 250 WO HCPCS SELF ADMINISTERED DRUGS (ALT 637 FOR MEDICARE OP): Performed by: SURGERY

## 2024-02-07 PROCEDURE — 71045 X-RAY EXAM CHEST 1 VIEW: CPT

## 2024-02-07 PROCEDURE — 2550000001 HC RX 255 CONTRASTS: Performed by: EMERGENCY MEDICINE

## 2024-02-07 RX ORDER — ONDANSETRON HYDROCHLORIDE 2 MG/ML
4 INJECTION, SOLUTION INTRAVENOUS ONCE
Status: COMPLETED | OUTPATIENT
Start: 2024-02-07 | End: 2024-02-07

## 2024-02-07 RX ORDER — ROCURONIUM BROMIDE 10 MG/ML
INJECTION, SOLUTION INTRAVENOUS AS NEEDED
Status: DISCONTINUED | OUTPATIENT
Start: 2024-02-07 | End: 2024-02-07

## 2024-02-07 RX ORDER — KETOROLAC TROMETHAMINE 30 MG/ML
15 INJECTION, SOLUTION INTRAMUSCULAR; INTRAVENOUS ONCE
Status: DISCONTINUED | OUTPATIENT
Start: 2024-02-07 | End: 2024-02-08

## 2024-02-07 RX ORDER — PROPOFOL 10 MG/ML
INJECTION, EMULSION INTRAVENOUS AS NEEDED
Status: DISCONTINUED | OUTPATIENT
Start: 2024-02-07 | End: 2024-02-07

## 2024-02-07 RX ORDER — NEOSTIGMINE METHYLSULFATE 1 MG/ML
INJECTION, SOLUTION INTRAVENOUS AS NEEDED
Status: DISCONTINUED | OUTPATIENT
Start: 2024-02-07 | End: 2024-02-07

## 2024-02-07 RX ORDER — ACETAMINOPHEN 160 MG/5ML
650 SOLUTION ORAL EVERY 6 HOURS
Status: DISCONTINUED | OUTPATIENT
Start: 2024-02-07 | End: 2024-02-08 | Stop reason: HOSPADM

## 2024-02-07 RX ORDER — FAMOTIDINE 10 MG/ML
20 INJECTION INTRAVENOUS ONCE
Status: COMPLETED | OUTPATIENT
Start: 2024-02-07 | End: 2024-02-07

## 2024-02-07 RX ORDER — ENOXAPARIN SODIUM 100 MG/ML
40 INJECTION SUBCUTANEOUS EVERY 24 HOURS
Status: DISCONTINUED | OUTPATIENT
Start: 2024-02-08 | End: 2024-02-08 | Stop reason: HOSPADM

## 2024-02-07 RX ORDER — HYDRALAZINE HYDROCHLORIDE 20 MG/ML
5 INJECTION INTRAMUSCULAR; INTRAVENOUS EVERY 30 MIN PRN
Status: DISCONTINUED | OUTPATIENT
Start: 2024-02-07 | End: 2024-02-07 | Stop reason: HOSPADM

## 2024-02-07 RX ORDER — OXYCODONE HYDROCHLORIDE 5 MG/1
10 TABLET ORAL EVERY 4 HOURS PRN
Status: DISCONTINUED | OUTPATIENT
Start: 2024-02-07 | End: 2024-02-08 | Stop reason: HOSPADM

## 2024-02-07 RX ORDER — LIDOCAINE HYDROCHLORIDE 20 MG/ML
INJECTION, SOLUTION INFILTRATION; PERINEURAL AS NEEDED
Status: DISCONTINUED | OUTPATIENT
Start: 2024-02-07 | End: 2024-02-07

## 2024-02-07 RX ORDER — ACETAMINOPHEN 650 MG/1
650 SUPPOSITORY RECTAL EVERY 6 HOURS
Status: DISCONTINUED | OUTPATIENT
Start: 2024-02-07 | End: 2024-02-08 | Stop reason: HOSPADM

## 2024-02-07 RX ORDER — IBUPROFEN 600 MG/1
600 TABLET ORAL EVERY 6 HOURS
Status: DISCONTINUED | OUTPATIENT
Start: 2024-02-07 | End: 2024-02-08 | Stop reason: HOSPADM

## 2024-02-07 RX ORDER — BUPIVACAINE HYDROCHLORIDE 5 MG/ML
INJECTION, SOLUTION PERINEURAL AS NEEDED
Status: DISCONTINUED | OUTPATIENT
Start: 2024-02-07 | End: 2024-02-07 | Stop reason: HOSPADM

## 2024-02-07 RX ORDER — ACETAMINOPHEN 325 MG/1
650 TABLET ORAL EVERY 6 HOURS
Status: DISCONTINUED | OUTPATIENT
Start: 2024-02-07 | End: 2024-02-08 | Stop reason: HOSPADM

## 2024-02-07 RX ORDER — ALBUTEROL SULFATE 0.83 MG/ML
2.5 SOLUTION RESPIRATORY (INHALATION) ONCE AS NEEDED
Status: DISCONTINUED | OUTPATIENT
Start: 2024-02-07 | End: 2024-02-07 | Stop reason: HOSPADM

## 2024-02-07 RX ORDER — OXYCODONE HYDROCHLORIDE 5 MG/1
5 TABLET ORAL EVERY 4 HOURS PRN
Status: DISCONTINUED | OUTPATIENT
Start: 2024-02-07 | End: 2024-02-07 | Stop reason: HOSPADM

## 2024-02-07 RX ORDER — GLYCOPYRROLATE 0.2 MG/ML
INJECTION INTRAMUSCULAR; INTRAVENOUS AS NEEDED
Status: DISCONTINUED | OUTPATIENT
Start: 2024-02-07 | End: 2024-02-07

## 2024-02-07 RX ORDER — DEXAMETHASONE SODIUM PHOSPHATE 4 MG/ML
INJECTION, SOLUTION INTRA-ARTICULAR; INTRALESIONAL; INTRAMUSCULAR; INTRAVENOUS; SOFT TISSUE AS NEEDED
Status: DISCONTINUED | OUTPATIENT
Start: 2024-02-07 | End: 2024-02-07

## 2024-02-07 RX ORDER — FENTANYL CITRATE 50 UG/ML
INJECTION, SOLUTION INTRAMUSCULAR; INTRAVENOUS AS NEEDED
Status: DISCONTINUED | OUTPATIENT
Start: 2024-02-07 | End: 2024-02-07

## 2024-02-07 RX ORDER — SODIUM CHLORIDE, SODIUM LACTATE, POTASSIUM CHLORIDE, CALCIUM CHLORIDE 600; 310; 30; 20 MG/100ML; MG/100ML; MG/100ML; MG/100ML
100 INJECTION, SOLUTION INTRAVENOUS CONTINUOUS
Status: DISCONTINUED | OUTPATIENT
Start: 2024-02-07 | End: 2024-02-08

## 2024-02-07 RX ORDER — IPRATROPIUM BROMIDE 0.5 MG/2.5ML
500 SOLUTION RESPIRATORY (INHALATION) ONCE
Status: DISCONTINUED | OUTPATIENT
Start: 2024-02-07 | End: 2024-02-07 | Stop reason: HOSPADM

## 2024-02-07 RX ORDER — MIDAZOLAM HYDROCHLORIDE 1 MG/ML
INJECTION, SOLUTION INTRAMUSCULAR; INTRAVENOUS AS NEEDED
Status: DISCONTINUED | OUTPATIENT
Start: 2024-02-07 | End: 2024-02-07

## 2024-02-07 RX ORDER — METOCLOPRAMIDE HYDROCHLORIDE 5 MG/ML
10 INJECTION INTRAMUSCULAR; INTRAVENOUS ONCE
Status: COMPLETED | OUTPATIENT
Start: 2024-02-07 | End: 2024-02-07

## 2024-02-07 RX ORDER — DIPHENHYDRAMINE HYDROCHLORIDE 50 MG/ML
12.5 INJECTION INTRAMUSCULAR; INTRAVENOUS ONCE AS NEEDED
Status: DISCONTINUED | OUTPATIENT
Start: 2024-02-07 | End: 2024-02-07 | Stop reason: HOSPADM

## 2024-02-07 RX ORDER — ONDANSETRON HYDROCHLORIDE 2 MG/ML
4 INJECTION, SOLUTION INTRAVENOUS ONCE AS NEEDED
Status: DISCONTINUED | OUTPATIENT
Start: 2024-02-07 | End: 2024-02-07 | Stop reason: HOSPADM

## 2024-02-07 RX ORDER — PHENYLEPHRINE HYDROCHLORIDE 10 MG/ML
INJECTION INTRAVENOUS AS NEEDED
Status: DISCONTINUED | OUTPATIENT
Start: 2024-02-07 | End: 2024-02-07

## 2024-02-07 RX ADMIN — DEXAMETHASONE SODIUM PHOSPHATE 8 MG: 4 INJECTION, SOLUTION INTRA-ARTICULAR; INTRALESIONAL; INTRAMUSCULAR; INTRAVENOUS; SOFT TISSUE at 10:26

## 2024-02-07 RX ADMIN — METOCLOPRAMIDE 10 MG: 5 INJECTION, SOLUTION INTRAMUSCULAR; INTRAVENOUS at 10:01

## 2024-02-07 RX ADMIN — ACETAMINOPHEN 650 MG: 325 TABLET ORAL at 20:03

## 2024-02-07 RX ADMIN — FENTANYL CITRATE 50 MCG: 50 INJECTION, SOLUTION INTRAMUSCULAR; INTRAVENOUS at 11:56

## 2024-02-07 RX ADMIN — FENTANYL CITRATE 50 MCG: 50 INJECTION, SOLUTION INTRAMUSCULAR; INTRAVENOUS at 10:16

## 2024-02-07 RX ADMIN — GLUCAGON HYDROCHLORIDE 1 MG: 1 INJECTION, POWDER, FOR SOLUTION INTRAMUSCULAR; INTRAVENOUS; SUBCUTANEOUS at 10:56

## 2024-02-07 RX ADMIN — GLYCOPYRROLATE 0.6 MG: 0.2 INJECTION INTRAMUSCULAR; INTRAVENOUS at 12:31

## 2024-02-07 RX ADMIN — ROCURONIUM BROMIDE 15 MG: 10 INJECTION, SOLUTION INTRAVENOUS at 10:27

## 2024-02-07 RX ADMIN — SODIUM CHLORIDE 1000 ML: 900 INJECTION, SOLUTION INTRAVENOUS at 04:23

## 2024-02-07 RX ADMIN — NEOSTIGMINE METHYLSULFATE 3 MG: 1 INJECTION, SOLUTION INTRAVENOUS at 12:31

## 2024-02-07 RX ADMIN — ONDANSETRON HYDROCHLORIDE 4 MG: 2 INJECTION INTRAMUSCULAR; INTRAVENOUS at 12:02

## 2024-02-07 RX ADMIN — FENTANYL CITRATE 50 MCG: 50 INJECTION, SOLUTION INTRAMUSCULAR; INTRAVENOUS at 11:19

## 2024-02-07 RX ADMIN — IBUPROFEN 600 MG: 600 TABLET, FILM COATED ORAL at 15:29

## 2024-02-07 RX ADMIN — FAMOTIDINE 20 MG: 10 INJECTION INTRAVENOUS at 00:55

## 2024-02-07 RX ADMIN — SODIUM CHLORIDE, SODIUM LACTATE, POTASSIUM CHLORIDE, AND CALCIUM CHLORIDE 100 ML/HR: 600; 310; 30; 20 INJECTION, SOLUTION INTRAVENOUS at 15:29

## 2024-02-07 RX ADMIN — ROCURONIUM BROMIDE 35 MG: 10 INJECTION, SOLUTION INTRAVENOUS at 10:16

## 2024-02-07 RX ADMIN — ROCURONIUM BROMIDE 10 MG: 10 INJECTION, SOLUTION INTRAVENOUS at 11:05

## 2024-02-07 RX ADMIN — LIDOCAINE HYDROCHLORIDE 20 MG: 20 INJECTION, SOLUTION INFILTRATION; PERINEURAL at 10:16

## 2024-02-07 RX ADMIN — PHENYLEPHRINE HYDROCHLORIDE 100 MCG: 10 INJECTION, SOLUTION INTRAMUSCULAR; INTRAVENOUS; SUBCUTANEOUS at 10:17

## 2024-02-07 RX ADMIN — MIDAZOLAM 2 MG: 1 INJECTION INTRAMUSCULAR; INTRAVENOUS at 10:16

## 2024-02-07 RX ADMIN — FAMOTIDINE 20 MG: 10 INJECTION INTRAVENOUS at 10:01

## 2024-02-07 RX ADMIN — IBUPROFEN 600 MG: 600 TABLET, FILM COATED ORAL at 20:03

## 2024-02-07 RX ADMIN — FENTANYL CITRATE 50 MCG: 50 INJECTION, SOLUTION INTRAMUSCULAR; INTRAVENOUS at 10:25

## 2024-02-07 RX ADMIN — TAZOBACTAM SODIUM AND PIPERACILLIN SODIUM 3.38 G: 375; 3 INJECTION, SOLUTION INTRAVENOUS at 11:14

## 2024-02-07 RX ADMIN — SODIUM CHLORIDE, SODIUM LACTATE, POTASSIUM CHLORIDE, AND CALCIUM CHLORIDE: 600; 310; 30; 20 INJECTION, SOLUTION INTRAVENOUS at 10:09

## 2024-02-07 RX ADMIN — PROPOFOL 200 MG: 10 INJECTION, EMULSION INTRAVENOUS at 10:16

## 2024-02-07 RX ADMIN — IOHEXOL 75 ML: 350 INJECTION, SOLUTION INTRAVENOUS at 03:28

## 2024-02-07 RX ADMIN — BENZOCAINE 6 MG-MENTHOL 10 MG LOZENGES 1 LOZENGE: at 19:44

## 2024-02-07 RX ADMIN — ACETAMINOPHEN 650 MG: 325 TABLET ORAL at 15:29

## 2024-02-07 RX ADMIN — MEPERIDINE HYDROCHLORIDE 12.5 MG: 25 INJECTION, SOLUTION INTRAMUSCULAR; INTRAVENOUS; SUBCUTANEOUS at 12:56

## 2024-02-07 RX ADMIN — MEPERIDINE HYDROCHLORIDE 12.5 MG: 25 INJECTION, SOLUTION INTRAMUSCULAR; INTRAVENOUS; SUBCUTANEOUS at 12:50

## 2024-02-07 RX ADMIN — PIPERACILLIN SODIUM AND TAZOBACTAM SODIUM 3.38 G: 3; .375 INJECTION, SOLUTION INTRAVENOUS at 09:14

## 2024-02-07 SDOH — ECONOMIC STABILITY: HOUSING INSECURITY
IN THE LAST 12 MONTHS, WAS THERE A TIME WHEN YOU DID NOT HAVE A STEADY PLACE TO SLEEP OR SLEPT IN A SHELTER (INCLUDING NOW)?: NO

## 2024-02-07 SDOH — ECONOMIC STABILITY: INCOME INSECURITY: IN THE LAST 12 MONTHS, WAS THERE A TIME WHEN YOU WERE NOT ABLE TO PAY THE MORTGAGE OR RENT ON TIME?: NO

## 2024-02-07 SDOH — HEALTH STABILITY: MENTAL HEALTH: HOW OFTEN DO YOU HAVE 6 OR MORE DRINKS ON ONE OCCASION?: PATIENT DECLINED

## 2024-02-07 SDOH — ECONOMIC STABILITY: FOOD INSECURITY: WITHIN THE PAST 12 MONTHS, YOU WORRIED THAT YOUR FOOD WOULD RUN OUT BEFORE YOU GOT MONEY TO BUY MORE.: PATIENT DECLINED

## 2024-02-07 SDOH — ECONOMIC STABILITY: TRANSPORTATION INSECURITY
IN THE PAST 12 MONTHS, HAS THE LACK OF TRANSPORTATION KEPT YOU FROM MEDICAL APPOINTMENTS OR FROM GETTING MEDICATIONS?: NO

## 2024-02-07 SDOH — HEALTH STABILITY: MENTAL HEALTH
STRESS IS WHEN SOMEONE FEELS TENSE, NERVOUS, ANXIOUS, OR CAN'T SLEEP AT NIGHT BECAUSE THEIR MIND IS TROUBLED. HOW STRESSED ARE YOU?: PATIENT DECLINED

## 2024-02-07 SDOH — SOCIAL STABILITY: SOCIAL INSECURITY: DO YOU FEEL ANYONE HAS EXPLOITED OR TAKEN ADVANTAGE OF YOU FINANCIALLY OR OF YOUR PERSONAL PROPERTY?: NO

## 2024-02-07 SDOH — SOCIAL STABILITY: SOCIAL INSECURITY: HAVE YOU HAD THOUGHTS OF HARMING ANYONE ELSE?: NO

## 2024-02-07 SDOH — SOCIAL STABILITY: SOCIAL INSECURITY: DO YOU FEEL UNSAFE GOING BACK TO THE PLACE WHERE YOU ARE LIVING?: NO

## 2024-02-07 SDOH — SOCIAL STABILITY: SOCIAL NETWORK: ARE YOU MARRIED, WIDOWED, DIVORCED, SEPARATED, NEVER MARRIED, OR LIVING WITH A PARTNER?: PATIENT DECLINED

## 2024-02-07 SDOH — HEALTH STABILITY: PHYSICAL HEALTH: ON AVERAGE, HOW MANY MINUTES DO YOU ENGAGE IN EXERCISE AT THIS LEVEL?: PATIENT DECLINED

## 2024-02-07 SDOH — HEALTH STABILITY: PHYSICAL HEALTH
ON AVERAGE, HOW MANY DAYS PER WEEK DO YOU ENGAGE IN MODERATE TO STRENUOUS EXERCISE (LIKE A BRISK WALK)?: PATIENT DECLINED

## 2024-02-07 SDOH — SOCIAL STABILITY: SOCIAL NETWORK: HOW OFTEN DO YOU ATTEND CHURCH OR RELIGIOUS SERVICES?: PATIENT DECLINED

## 2024-02-07 SDOH — SOCIAL STABILITY: SOCIAL INSECURITY: ARE YOU OR HAVE YOU BEEN THREATENED OR ABUSED PHYSICALLY, EMOTIONALLY, OR SEXUALLY BY ANYONE?: NO

## 2024-02-07 SDOH — SOCIAL STABILITY: SOCIAL NETWORK: IN A TYPICAL WEEK, HOW MANY TIMES DO YOU TALK ON THE PHONE WITH FAMILY, FRIENDS, OR NEIGHBORS?: PATIENT DECLINED

## 2024-02-07 SDOH — SOCIAL STABILITY: SOCIAL INSECURITY: ARE THERE ANY APPARENT SIGNS OF INJURIES/BEHAVIORS THAT COULD BE RELATED TO ABUSE/NEGLECT?: NO

## 2024-02-07 SDOH — SOCIAL STABILITY: SOCIAL INSECURITY
WITHIN THE LAST YEAR, HAVE TO BEEN RAPED OR FORCED TO HAVE ANY KIND OF SEXUAL ACTIVITY BY YOUR PARTNER OR EX-PARTNER?: PATIENT DECLINED

## 2024-02-07 SDOH — HEALTH STABILITY: MENTAL HEALTH: HOW MANY STANDARD DRINKS CONTAINING ALCOHOL DO YOU HAVE ON A TYPICAL DAY?: PATIENT DECLINED

## 2024-02-07 SDOH — SOCIAL STABILITY: SOCIAL INSECURITY: HAS ANYONE EVER THREATENED TO HURT YOUR FAMILY OR YOUR PETS?: NO

## 2024-02-07 SDOH — SOCIAL STABILITY: SOCIAL INSECURITY
WITHIN THE LAST YEAR, HAVE YOU BEEN KICKED, HIT, SLAPPED, OR OTHERWISE PHYSICALLY HURT BY YOUR PARTNER OR EX-PARTNER?: PATIENT DECLINED

## 2024-02-07 SDOH — HEALTH STABILITY: MENTAL HEALTH: CURRENT SMOKER: 0

## 2024-02-07 SDOH — SOCIAL STABILITY: SOCIAL NETWORK: HOW OFTEN DO YOU ATTENT MEETINGS OF THE CLUB OR ORGANIZATION YOU BELONG TO?: PATIENT DECLINED

## 2024-02-07 SDOH — ECONOMIC STABILITY: HOUSING INSECURITY: IN THE LAST 12 MONTHS, HOW MANY PLACES HAVE YOU LIVED?: 1

## 2024-02-07 SDOH — SOCIAL STABILITY: SOCIAL INSECURITY: WERE YOU ABLE TO COMPLETE ALL THE BEHAVIORAL HEALTH SCREENINGS?: YES

## 2024-02-07 SDOH — SOCIAL STABILITY: SOCIAL INSECURITY: WITHIN THE LAST YEAR, HAVE YOU BEEN AFRAID OF YOUR PARTNER OR EX-PARTNER?: PATIENT DECLINED

## 2024-02-07 SDOH — SOCIAL STABILITY: SOCIAL NETWORK
DO YOU BELONG TO ANY CLUBS OR ORGANIZATIONS SUCH AS CHURCH GROUPS UNIONS, FRATERNAL OR ATHLETIC GROUPS, OR SCHOOL GROUPS?: PATIENT DECLINED

## 2024-02-07 SDOH — SOCIAL STABILITY: SOCIAL INSECURITY: DOES ANYONE TRY TO KEEP YOU FROM HAVING/CONTACTING OTHER FRIENDS OR DOING THINGS OUTSIDE YOUR HOME?: NO

## 2024-02-07 SDOH — ECONOMIC STABILITY: FOOD INSECURITY: WITHIN THE PAST 12 MONTHS, THE FOOD YOU BOUGHT JUST DIDN'T LAST AND YOU DIDN'T HAVE MONEY TO GET MORE.: PATIENT DECLINED

## 2024-02-07 SDOH — ECONOMIC STABILITY: TRANSPORTATION INSECURITY
IN THE PAST 12 MONTHS, HAS LACK OF TRANSPORTATION KEPT YOU FROM MEETINGS, WORK, OR FROM GETTING THINGS NEEDED FOR DAILY LIVING?: NO

## 2024-02-07 SDOH — ECONOMIC STABILITY: INCOME INSECURITY: HOW HARD IS IT FOR YOU TO PAY FOR THE VERY BASICS LIKE FOOD, HOUSING, MEDICAL CARE, AND HEATING?: NOT HARD AT ALL

## 2024-02-07 SDOH — SOCIAL STABILITY: SOCIAL NETWORK: HOW OFTEN DO YOU GET TOGETHER WITH FRIENDS OR RELATIVES?: PATIENT DECLINED

## 2024-02-07 SDOH — SOCIAL STABILITY: SOCIAL INSECURITY
WITHIN THE LAST YEAR, HAVE YOU BEEN HUMILIATED OR EMOTIONALLY ABUSED IN OTHER WAYS BY YOUR PARTNER OR EX-PARTNER?: PATIENT DECLINED

## 2024-02-07 SDOH — SOCIAL STABILITY: SOCIAL INSECURITY: ABUSE: ADULT

## 2024-02-07 SDOH — ECONOMIC STABILITY: INCOME INSECURITY
IN THE PAST 12 MONTHS, HAS THE ELECTRIC, GAS, OIL, OR WATER COMPANY THREATENED TO SHUT OFF SERVICE IN YOUR HOME?: PATIENT DECLINED

## 2024-02-07 SDOH — HEALTH STABILITY: MENTAL HEALTH: HOW OFTEN DO YOU HAVE A DRINK CONTAINING ALCOHOL?: PATIENT DECLINED

## 2024-02-07 ASSESSMENT — PAIN SCALES - GENERAL
PAINLEVEL_OUTOF10: 2
PAINLEVEL_OUTOF10: 1
PAINLEVEL_OUTOF10: 0 - NO PAIN
PAINLEVEL_OUTOF10: 2
PAIN_LEVEL: 0
PAINLEVEL_OUTOF10: 1
PAINLEVEL_OUTOF10: 2
PAINLEVEL_OUTOF10: 1
PAINLEVEL_OUTOF10: 0 - NO PAIN
PAINLEVEL_OUTOF10: 2
PAINLEVEL_OUTOF10: 0 - NO PAIN
PAINLEVEL_OUTOF10: 3
PAINLEVEL_OUTOF10: 0 - NO PAIN

## 2024-02-07 ASSESSMENT — ACTIVITIES OF DAILY LIVING (ADL)
WALKS IN HOME: INDEPENDENT
ADEQUATE_TO_COMPLETE_ADL: YES
GROOMING: INDEPENDENT
FEEDING YOURSELF: INDEPENDENT
BATHING: INDEPENDENT
JUDGMENT_ADEQUATE_SAFELY_COMPLETE_DAILY_ACTIVITIES: YES
TOILETING: INDEPENDENT
DRESSING YOURSELF: INDEPENDENT
HEARING - LEFT EAR: FUNCTIONAL
LACK_OF_TRANSPORTATION: NO
HEARING - RIGHT EAR: FUNCTIONAL
PATIENT'S MEMORY ADEQUATE TO SAFELY COMPLETE DAILY ACTIVITIES?: YES

## 2024-02-07 ASSESSMENT — COLUMBIA-SUICIDE SEVERITY RATING SCALE - C-SSRS
1. IN THE PAST MONTH, HAVE YOU WISHED YOU WERE DEAD OR WISHED YOU COULD GO TO SLEEP AND NOT WAKE UP?: NO
6. HAVE YOU EVER DONE ANYTHING, STARTED TO DO ANYTHING, OR PREPARED TO DO ANYTHING TO END YOUR LIFE?: NO
2. HAVE YOU ACTUALLY HAD ANY THOUGHTS OF KILLING YOURSELF?: NO

## 2024-02-07 ASSESSMENT — COGNITIVE AND FUNCTIONAL STATUS - GENERAL
PATIENT BASELINE BEDBOUND: NO
MOBILITY SCORE: 24
DAILY ACTIVITIY SCORE: 24

## 2024-02-07 ASSESSMENT — ENCOUNTER SYMPTOMS
CHEST TIGHTNESS: 0
DIZZINESS: 0
PALPITATIONS: 0
NUMBNESS: 0
FATIGUE: 0
BACK PAIN: 0
SORE THROAT: 0
APPETITE CHANGE: 0
NAUSEA: 1
ABDOMINAL PAIN: 1
WHEEZING: 0
BLOOD IN STOOL: 0
COUGH: 0
FREQUENCY: 0
JOINT SWELLING: 0
COLOR CHANGE: 0
EYE REDNESS: 0
CHILLS: 0
ARTHRALGIAS: 0
SEIZURES: 0
AGITATION: 0
UNEXPECTED WEIGHT CHANGE: 0
EYE DISCHARGE: 0
TROUBLE SWALLOWING: 0
ANAL BLEEDING: 0
FEVER: 0
HEADACHES: 0
DIARRHEA: 0
HEMATURIA: 0
DYSPHORIC MOOD: 0
DIFFICULTY URINATING: 0
RECTAL PAIN: 0
CONSTIPATION: 0
WEAKNESS: 0
NERVOUS/ANXIOUS: 0
DIAPHORESIS: 0
NECK PAIN: 0
ADENOPATHY: 0
HALLUCINATIONS: 0
BRUISES/BLEEDS EASILY: 0
DYSURIA: 0
SHORTNESS OF BREATH: 0
VOMITING: 0
POLYDIPSIA: 0

## 2024-02-07 ASSESSMENT — LIFESTYLE VARIABLES
AUDIT-C TOTAL SCORE: 0
AUDIT-C TOTAL SCORE: -1
AUDIT-C TOTAL SCORE: 0
PRESCIPTION_ABUSE_PAST_12_MONTHS: NO
SUBSTANCE_ABUSE_PAST_12_MONTHS: NO
HOW OFTEN DO YOU HAVE 6 OR MORE DRINKS ON ONE OCCASION: NEVER
HOW MANY STANDARD DRINKS CONTAINING ALCOHOL DO YOU HAVE ON A TYPICAL DAY: PATIENT DOES NOT DRINK
SKIP TO QUESTIONS 9-10: 1
HOW OFTEN DO YOU HAVE A DRINK CONTAINING ALCOHOL: NEVER
SKIP TO QUESTIONS 9-10: 0

## 2024-02-07 ASSESSMENT — PATIENT HEALTH QUESTIONNAIRE - PHQ9
1. LITTLE INTEREST OR PLEASURE IN DOING THINGS: NOT AT ALL
2. FEELING DOWN, DEPRESSED OR HOPELESS: NOT AT ALL
SUM OF ALL RESPONSES TO PHQ9 QUESTIONS 1 & 2: 0

## 2024-02-07 ASSESSMENT — HEART SCORE
HISTORY: SLIGHTLY SUSPICIOUS
ECG: NORMAL
RISK FACTORS: NO KNOWN RISK FACTORS
AGE: 45-64
HEART SCORE: 1
TROPONIN: LESS THAN OR EQUAL TO NORMAL LIMIT

## 2024-02-07 NOTE — OP NOTE
Cholecystectomy Laparoscopy, Insertion Catheter Percutaneous Biliary Tract, Repair Percutaneous Biliary Tract Operative Note     Date: 2024  OR Location: [facility]    Name: Lorenzo Peraza, : 1960, Age: 63 y.o., MRN: [id number], Sex: male    Diagnosis  Pre-op Diagnosis     * Calculus of gallbladder with acute cholecystitis and obstruction [K80.01] Post-op Diagnosis     * Calculus of gallbladder with acute cholecystitis and obstruction [K80.01]     Procedures  Cholecystectomy Laparoscopy  09566 - NE LAPS SURG CHOLECSTC W/EXPL COMMON DUCT    Insertion Catheter Percutaneous Biliary Tract  37017 - NE PRQ PLMT BILIARY DRG CATH W/IMG GID RS&I INT-EXT    Repair Percutaneous Biliary Tract  60418 - NE BALLOON DILAT BILIARY DUCT/AMPULLA PRQ EACH DUCT      Surgeons      * Stas Garcia - Primary    Resident/Fellow/Other Assistant:  Surgeon(s) and Role:    Procedure Summary  Anesthesia: General  ASA: II  Anesthesia Staff: Anesthesiologist: Carter Vazquez MD  CRNA: MASSIMO Paulino-CRNA  Estimated Blood Loss: 10mL  Intra-op Medications:   Administrations occurring from 0945 to 1105 on 24:   Medication Name Total Dose   BUPivacaine HCl (Marcaine) 0.5 % (5 mg/mL) injection 30 mL   iohexol (OMNIPaque) 350 mg iodine/mL solution 30 mL   famotidine PF (Pepcid) injection 20 mg 20 mg   metoclopramide (Reglan) injection 10 mg 10 mg              Anesthesia Record               Intraprocedure I/O Totals          Intake    LR bolus 1700.00 mL    piperacillin-tazobactam (Zosyn) IV 3.375 g in 50 mL (premix) 50.00 mL    Total Intake 1750 mL       Output    Est. Blood Loss 15 mL    Total Output 15 mL       Net    Net Volume 1735 mL          Specimen:   ID Type Source Tests Collected by Time   A : GALLBLADDER AND CONTENTS Calculus Gallbladder CALCULI (STONE) ANALYSIS Stas Garcia MD 2024 1058        Staff:   Circulator: Elis Rodrigez RN  Scrub Person: Marlen Astudillo  RNFA: Beto Barney         Drains  and/or Catheters: * None in log *    Findings: Critical view of safety obtained.  Initial images revealed a stone in the distal common bile duct.  Final cholangiogram revealed clearance of the stone.    Indications: Lorenzo Peraza is an 63 y.o. male who is having surgery for Calculus of gallbladder with acute cholecystitis and obstruction [K80.01].     The patient was seen in the preoperative area. The risks, benefits, complications, treatment options, non-operative alternatives, expected recovery and outcomes were discussed with the patient. The possibilities of reaction to medication, pulmonary aspiration, injury to surrounding structures, bleeding, recurrent infection, the need for additional procedures, failure to diagnose a condition, and creating a complication requiring transfusion or operation were discussed with the patient. The patient concurred with the proposed plan, giving informed consent.  The site of surgery was properly noted/marked if necessary per policy. The patient has been actively warmed in preoperative area. Preoperative antibiotics are not indicated. Venous thrombosis prophylaxis have been ordered including bilateral sequential compression devices    Procedure Details: The patient was brought to the operating room and placed on the table in the supine position.  General anesthesia was achieved by the anesthesia service.  The abdomen was prepped and draped in the usual sterile manner.  An infraumbilical incision was made in the midline using the knife.  The incision was carried down through skin and subcutaneous tissue using electrocautery.  The subcutaneous fat was bluntly spread away from the base of the umbilicus.  The base of the umbilicus was grasped and elevated.  The fascia was opened in the midline using the knife.  A stay suture of 0 Vicryl was placed in horizontal mattress fashion across the fascial opening.  The Moe trocar was placed through the fascial opening and secured  using the stay suture.  The abdomen was insufflated to a pressure of 15 mmHg.  The camera was placed into the abdomen and the gallbladder and liver easily visualized.  A second trocar was placed in the epigastrium through a separate 15 mm incision.  The trocar was observed as it entered the abdomen and cause no intra-abdominal injuries.  2 additional trocars were placed in the right lateral abdominal wall.  The first was placed near the midclavicular line.  The second was placed near the mid axillary line.  Both were observed as they entered the abdomen and cause no intra-abdominal injuries.  The fundus of the gallbladder was grasped and elevated.  The infundibulum was grasped and retracted inferiorly and laterally.  Blunt dissection was undertaken in the hepatocystic window.  The cystic duct and cystic artery were bluntly dissected free from surrounding structures.  The critical view of safety was obtained.  The cystic duct was clipped at the neck of the gallbladder.  A ductotomy was made just below the clip using the scissors.  The cholangiogram catheter was brought into the abdomen using a 14-gauge Angiocath placed through the anterior abdominal wall.  The catheter was placed into the ductal opening and secured with a clip.  Intraoperative cholangiography was then performed which revealed normal biliary anatomy with an abrupt cut off at the distal common bile duct consistent with the stone.  A milligram of glucagon was administered by anesthesia and allowed to circulate for 3 minutes.  Repeat cholangiogram was done which continued to show abrupt cut off of contrast at the distal common bile duct.  The decision was made to undertake a laparoscopic common bile duct exploration.  The cholangiogram catheter and clip were removed.  The introducer needle was placed through the same hole and the guidewire passed through this needle and into the cystic duct.  The skin was incised next to the guidewire after the needle  was removed.  The introducer and dilator were passed over the guidewire and into the cystic duct.  The dilator was removed and the guidewire was exchanged for the hydrophilic wire.  The wire was advanced into the duodenum.  The dilating balloon was then passed over the guidewire and positioned with the marks on either side of the ampulla.  The balloon was inflated with 2 cc of contrast and maintained for 2 minutes.  The balloon was then deflated and the catheter pulled back.  The balloon catheter was removed and the cholangiogram catheter was replaced into the cystic duct opening.  Repeat cholangiogram was done which revealed normal biliary anatomy with prompt emptying of contrast into the duodenum.  There were no remaining filling defects.  The catheter and clip were again removed and 3 additional clips were placed on the cystic duct proximal to the ductotomy.  The cystic duct was then divided using the scissors.  The cystic artery was similarly clipped twice proximally, once distally and divided using the scissors.  A small posterior branch of the artery was similarly clipped and divided.  The gallbladder was removed from the liver bed using electrocautery.  The gallbladder was placed into an Endo Catch bag and removed through the epigastric trocar site.  The trocar was replaced and the right upper quadrant was copiously irrigated with sterile saline solution.  Small bleeding points on the liver bed were controlled using electrocautery.  Irrigation was continued until it was completely clear.  The clips were inspected and noted to be intact.  The trocars were then removed and the pneumoperitoneum released.  The stay suture at the umbilicus was tied down and found to close that defect adequately.  The fascia at the epigastric trocar site was closed using 0 Vicryl suture in running fashion.  The skin at all 4 trocar sites was closed using 4-0 Monocryl suture in subcuticular fashion.  The wounds were dressed with  Mastisol, Steri-Strips, and sterile gauze dressing.  The patient was awakened, extubated, and transferred by cart to the recovery room in stable condition.  He will be brought in for observation and will be started on a clear liquid diet.  He is expected to be discharged home tomorrow.  Complications:  None; patient tolerated the procedure well.    Disposition: PACU - hemodynamically stable.  Condition: stable       Stas Garcia  Phone Number: [phone]

## 2024-02-07 NOTE — ANESTHESIA POSTPROCEDURE EVALUATION
Patient: Lorenzo Peraza    Procedure Summary       Date: 02/07/24 Room / Location: [facility] OR 08 / Virtual [facility] OR    Anesthesia Start: 1009 Anesthesia Stop: 1239    Procedures:       Cholecystectomy Laparoscopy (Abdomen)      Insertion Catheter Percutaneous Biliary Tract Diagnosis:       Calculus of gallbladder with acute cholecystitis and obstruction      (Calculus of gallbladder with acute cholecystitis and obstruction [K80.01])    Surgeons: Stas Garcia MD Responsible Provider: Carter Vazquez MD    Anesthesia Type: general ASA Status: 2            Anesthesia Type: general    Vitals Value Taken Time   /74 02/07/24 1238   Temp 36 °C (96.8 °F) 02/07/24 1238   Pulse 72 02/07/24 1238   Resp 16 02/07/24 1238   SpO2 97 % 02/07/24 1238       Anesthesia Post Evaluation    Patient location during evaluation: PACU  Patient participation: complete - patient participated  Level of consciousness: awake  Pain score: 0  Pain management: adequate  Multimodal analgesia pain management approach  Airway patency: patent  Two or more strategies used to mitigate risk of obstructive sleep apnea  Cardiovascular status: acceptable  Respiratory status: acceptable  Hydration status: acceptable  Postoperative Nausea and Vomiting: none  Comments: No Nausea    There were no known notable events for this encounter.

## 2024-02-07 NOTE — ANESTHESIA PROCEDURE NOTES
Airway  Date/Time: 2/7/2024 10:18 AM  Urgency: elective    Airway not difficult    Staffing  Performed: attending   Authorized by: Carter Sadler MD    Performed by: MASSIMO Paulino-RHONDA  Patient location during procedure: OR    Indications and Patient Condition  Indications for airway management: anesthesia  Spontaneous ventilation: present  Sedation level: deep  Preoxygenated: yes  Patient position: sniffing  Mask difficulty assessment: 2 - vent by mask + OA or adjuvant +/- NMBA  Planned trial extubation    Final Airway Details  Final airway type: endotracheal airway      Successful airway: ETT  Cuffed: yes   Successful intubation technique: direct laryngoscopy  Facilitating devices/methods: intubating stylet  Blade: Mikaela  Blade size: #3  ETT size (mm): 7.5  Cormack-Lehane Classification: grade IIb - view of arytenoids or posterior of glottis only  Placement verified by: capnometry and palpation of cuff   Cuff volume (mL): 7  Measured from: lips  ETT to lips (cm): 23  Number of attempts at approach: 1    Additional Comments  Attempted by nandini avilez student with no view. 2nd DL by md sadler with grade 2 view

## 2024-02-07 NOTE — ED PROVIDER NOTES
HPI   No chief complaint on file.      63-year-old male with no significant past medical history comes to the emergency department with a chief complaint of chest pain.  The patient notes that he drinks coffee with cream and had a similar episode tonight on Monday that resolved without intervention.  Last night again he had the coffee with cream and had some mild abdominal discomfort and went to bed.  He was awoken from sleep with stabbing substernal chest pain that does not radiate.  He recently had a surgery to his left knee 8 weeks ago by Dr. Ramirez.  He has had no complications from surgery and denies any leg swelling.  Patient notes that he is a non-smoker and has no family history of CAD or VTE.  He denies any cough, shortness of breath, hemoptysis, history of cancer, chemotherapy, recent trauma.  He states that his pain is unchanged, but he was just recently given narcotic medications by EMS.      History provided by:  Patient and spouse   used: No                        Laura Coma Scale Score: 15   HEART Score: 1                   Patient History   Past Medical History:   Diagnosis Date    Body mass index (BMI) 26.0-26.9, adult 05/14/2021    BMI 26.0-26.9,adult    Heart murmur     since age 16 no intervention    Hyperlipidemia     Left knee pain     Osteoarthritis      Past Surgical History:   Procedure Laterality Date    COLONOSCOPY      x 2    CT HEAD ANGIO W AND WO IV CONTRAST  06/22/2021    CT HEAD ANGIO W AND WO IV CONTRAST 6/22/2021 [facility] [place] ANCILLARY LEGACY    KNEE  2017    KNEE Bilateral     scope    KNEE ARTHROPLASTY Right 02/2023    KNEE SURGERY  07/18/2017    Knee Surgery    KNEE SURGERY Left 12/12/2023    TONSILLECTOMY       Family History   Problem Relation Name Age of Onset    Liver cancer Mother      Colon cancer Mother      Diabetes Mother      Lung disease Father      No Known Problems Sister      No Known Problems Sister      No Known Problems Sister      No Known Problems  Brother      No Known Problems Daughter      No Known Problems Daughter      No Known Problems Son       Social History     Tobacco Use    Smoking status: Never     Passive exposure: Never    Smokeless tobacco: Never   Vaping Use    Vaping Use: Never used   Substance Use Topics    Alcohol use: Not Currently    Drug use: Never       Physical Exam   ED Triage Vitals [02/07/24 0101]   Temperature Heart Rate Respirations BP   36.9 °C (98.4 °F) 56 18 124/74      Pulse Ox Temp Source Heart Rate Source Patient Position   97 % Oral -- --      BP Location FiO2 (%)     -- --       Physical Exam  Vitals and nursing note reviewed.   Constitutional:       General: He is not in acute distress.     Appearance: He is well-developed.   HENT:      Head: Normocephalic and atraumatic.   Eyes:      Conjunctiva/sclera: Conjunctivae normal.   Cardiovascular:      Rate and Rhythm: Normal rate and regular rhythm.      Heart sounds: No murmur heard.  Pulmonary:      Effort: Pulmonary effort is normal. No respiratory distress.      Breath sounds: Normal breath sounds. No stridor. No wheezing, rhonchi or rales.   Chest:      Chest wall: No tenderness.   Abdominal:      General: There is no distension.      Palpations: Abdomen is soft.      Tenderness: There is abdominal tenderness. There is guarding. There is no right CVA tenderness, left CVA tenderness or rebound.      Hernia: No hernia is present.      Comments: Mild generalized tenderness, involuntary guarding without rebound in right upper quadrant and epigastrium   Musculoskeletal:         General: No swelling.      Cervical back: Neck supple.   Skin:     General: Skin is warm and dry.      Capillary Refill: Capillary refill takes less than 2 seconds.   Neurological:      General: No focal deficit present.      Mental Status: He is alert.   Psychiatric:         Mood and Affect: Mood normal.         ED Course & MDM   ED Course as of 02/07/24 0826 Wed Feb 07, 2024   0030 ECG 12 Lead  ECG  performed on February 7, 2024 at 12:26 AM and interpreted at 12:30 AM showing NSR, NAD, intervals within normal limits, no STEMI.  [EG]   0541 CT chest abdomen pelvis w IV contrast  IMPRESSION:  No distinct acute intrathoracic, intra-abdominal, or pelvic process  identified.  Distal colonic diverticulosis without CT evidence for acute  diverticulitis.  Cholelithiasis with mildly distended appearance of the gallbladder.   Consider additional dedicated ultrasound evaluation as clinically  warranted.   [EG]   0814 Consulted general surgery, , who will admit for further management. [LOY]   0818 D-dimer, quantitative(!)  Elevated [EG]   0818 Comprehensive Metabolic Panel(!) [EG]   0818 CMP without basic electrolyte disorder or acute kidney injury, elevated alk phos, bilirubin and liver enzymes [EG]   0819 Troponin T Series, High Sensitivity (0, 2HR, 6HR)  Within normal limits [EG]   0819 CBC(!)  Within normal limits [EG]   0819 Lipase  Within normal limits [EG]      ED Course User Index  [EG] Anu Brown MD  [LOY] Kristin Hughes MD         Diagnoses as of 02/07/24 0826   Calculus of gallbladder with acute cholecystitis and obstruction   Elevated liver enzymes   Hyperbilirubinemia   Elevated d-dimer   Other chest pain       Medical Decision Making    HPI:  As Above  PMHx/PSHx/Meds/Allergies/SH/FH as per nursing documentation and reviewed.  Review of systems: Total of 10 systems reviewed and otherwise negative except as noted elsewhere    DDX: As described in MDM    If performed, radiology listed above interpreted by me and confirmed by the Radiologist.  Medications administered during this visit (name and route): see MAR  Social determinants of health considered for this visit: lives at home  If performed, EKG interpreted by me and detailed above    MDM Summary/considerations:  DDX: ACS, MI, myocarditis, pericarditis, CHF, Arrhythmia, GERD, Esophagitis, PE, Pneumonia, COPD, bronchitis, pleurisy,   trauma, chest wall pain, costochondritis, PTX, effusion,  aortic aneurysm, dissection, anxiety or referred pain    63-year-old male presenting with acute chest pain that is not consistent with ACS as his heart score is 1 with no risk factors and negative troponin.  The patient was intermediate risk given Wells criteria of recent orthopedic surgery.  He was evaluated with a D-dimer that was positive which prompted evaluation with a CT scan with contrast of his chest.  He also had pain in the right upper quadrant with concern for pathology in that area including gallbladder pathology, liver pathology, pancreatitis, colitis, gastritis.  It is negative for a pulmonary embolism or pneumonia or effusion or pneumomediastinum or aortic dissection/aneurysm.  It is showing evidence of pericholecystic fluid and gallstones.  The patient's liver enzymes are elevated with hyperbilirubinemia and choledocholithiasis is still part of the differential.  His pain has been well-controlled with medications administered by EMS and stated that he does not need the Toradol that was ordered for him.  He is still pending an ultrasound for final disposition as he may require an ERCP versus cholecystectomy with MRCP.  Care transferred to the day team.  Currently the patient is not meeting SIRS criteria and has not had a fever, tachycardia or tachypnea.  Sepsis is not likely    31 minutes of critical care time were spent on re-examinations, close monitoring, data analysis and communication with patient/family/other providers and do not include time spent on procedures. Not concurrent with other providers      I completed a HEART Score, less than or equal to 3, to screen for Major Adverse Cardiac Event (MACE) in this patient. The evidence indicates that the patient is very low risk for MACE and this is consistent with my clinical intuition.  No hospitalization indicated. The risk of further workup or hospitalization for MACE is likely higher  than the risk of the patient having a MACE. It is, therefore, in the patients best interest not to do additional emergent testing or to be hospitalized for MACE. I have discussed with the patient my clinical impression and the result of the HEART Score to screen for MACE, as well as the risks of further testing and hospitalization. The HEART Score shows that the risk for MACE is less than 1-2%      The patient was seen and triaged by our nursing/medic staff, their vitals were taken and the staff notes were reviewed.  If the patient arrived by an EMS squad or an outside agency, we discussed the case with transporting EMS medic, police, or other historians. My initial assessment was attention to their airway, breathing, and circulatory status.  We addressed any immediate or life threatening findings and completed a medical history and a physical exam if the patient or those legally responsible were in agreement with this.   Prior to the patient being discharged, I or my PA/NP or the nursing staff discussed the differential, results and discharge plan with the patient and/or family/friend/caregiver if present.  I emphasized the importance of follow-up in 2-3 days unless otherwise specified.  I explained reasons for the patient to return to the Emergency Department. Additional verbal discharge instructions were also given and discussed with the patient to supplement those generated by the EMR. We also discussed medications that were prescribed (if any) including common side effects and interactions. The patient was advised to abstain from driving, operating heavy machinery or making significant decisions while taking medications such as antihistamines, benzodiazepines, opiates and muscle relaxers. All questions were addressed.  They understand return precautions and discharge instructions. The patient and/or family/friend/caregiver expressed understanding.  **Disclaimer:  This note was dictated by speech recognition  technology.  Minor errors in transcription may be present.  Please contact for clarification or corrections.    In the case the patient eloped or refused treatment/admission, we offered to the best of our ability to provide care to the patient at the time of this encounter.    Amount and/or Complexity of Data Reviewed  External Data Reviewed: labs and notes.  Labs: ordered. Decision-making details documented in ED Course.  Radiology: ordered and independent interpretation performed. Decision-making details documented in ED Course.  ECG/medicine tests: ordered and independent interpretation performed. Decision-making details documented in ED Course.        Procedure  Procedures     Anu Brown MD  02/07/24 0870

## 2024-02-07 NOTE — ANESTHESIA PREPROCEDURE EVALUATION
Patient: Lorenzo Peraza    Procedure Information       Date/Time: 02/07/24 0945    Procedure: Cholecystectomy Laparoscopy    Location: [facility] / [facility]    Surgeons: Stas Garcia MD            Relevant Problems   Anesthesia (within normal limits)      Cardiovascular   (+) Atypical chest pain   (+) Heart murmur   (+) Hyperlipidemia      Endocrine (within normal limits)      GI   (+) Gastroesophageal reflux disease      /Renal (within normal limits)      Neuro/Psych   (+) Anxiety   (+) Carpal tunnel syndrome      Pulmonary (within normal limits)      GI/Hepatic   (+) Calculus of gallbladder with acute cholecystitis and obstruction      Hematology (within normal limits)      Musculoskeletal   (+) Carpal tunnel syndrome   (+) Osteoarthritis of knee      Eyes, Ears, Nose, and Throat (within normal limits)      Infectious Disease   (+) Onychomycosis due to dermatophyte      Other   (+) Impingement syndrome of left shoulder       Clinical information reviewed:    Allergies                NPO Detail:  No data recorded     Physical Exam    Airway  Mallampati: I  TM distance: >3 FB  Neck ROM: full     Cardiovascular - normal exam     Dental - normal exam     Pulmonary    Abdominal          Allergies   Allergen Reactions    Cortisone Unknown     Prior to Admission medications    Medication Sig Start Date End Date Taking? Authorizing Provider   acetaminophen (Tylenol Extra Strength) 500 mg tablet Take by mouth every 6 hours.  2/2/24  Historical Provider, MD   acetaminophen (Tylenol) 325 mg tablet Take by mouth 4 times a day as needed. 12/12/23 2/2/24  Historical Provider, MD   amoxicillin (Amoxil) 500 mg capsule  4/25/23 2/2/24  Historical Provider, MD   apixaban (Eliquis) 2.5 mg tablet Take 1 tablet (2.5 mg) by mouth every 12 hours for 14 days. 12/13/23 2/2/24  Marjorie Narvaez PA-C   bisacodyl (Dulcolax, bisacodyl,) 5 mg EC tablet Take 1 tablet (5 mg) by mouth once daily as needed for constipation.  2/2/24   Historical Provider, MD   esomeprazole (NexIUM) 40 mg DR capsule Take by mouth. 11/29/22 2/2/24  Historical Provider, MD   famotidine (Pepcid) 40 mg tablet Take by mouth. 12/16/22 2/2/24  Historical Provider, MD   ferrous sulfate, 325 mg ferrous sulfate, tablet Take 2 tablets by mouth. 12/13/23 2/2/24  Historical Provider, MD   fluticasone (Flonase) 50 mcg/actuation nasal spray Administer into affected nostril(s) once daily. 7/18/17 2/2/24  Historical Provider, MD   HYDROcodone-acetaminophen (Norco) 5-325 mg tablet Take 1 tablet by mouth every 6 hours if needed for severe pain (7 - 10).  Patient not taking: Reported on 1/5/2024 12/13/23 2/2/24  Marjorie Narvaez PA-C   ibuprofen 200 mg tablet every 8 hours.  2/2/24  Historical Provider, MD   methylPREDNISolone (Medrol) 4 mg tablet Take by mouth. 8/10/18 2/2/24  Historical Provider, MD   nystatin-triamcinolone (Mycolog II) cream every 12 hours. 1/19/22 2/2/24  Historical Provider, MD   omeprazole (PriLOSEC) 40 mg DR capsule omeprazole (PriLOSEC) 40 mg DR capsule 10/17/22 2/2/24  Historical Provider, MD   ondansetron (Zofran) 4 mg tablet Take 1 tablet (4 mg) by mouth every 8 hours if needed. 12/12/23 2/2/24  Historical Provider, MD   oxyCODONE-acetaminophen (Percocet) 5-325 mg tablet Take by mouth every 6 hours.  2/2/24  Historical Provider, MD   polyethylene glycol (Glycolax, Miralax) 17 gram packet Take 17 g by mouth once daily. 12/12/23 2/2/24  Historical Provider, MD     Past Medical History:   Diagnosis Date    Body mass index (BMI) 26.0-26.9, adult 05/14/2021    BMI 26.0-26.9,adult    Heart murmur     since age 16 no intervention    Hyperlipidemia     Left knee pain     Osteoarthritis      Past Surgical History:   Procedure Laterality Date    COLONOSCOPY      x 2    CT HEAD ANGIO W AND WO IV CONTRAST  06/22/2021    CT HEAD ANGIO W AND WO IV CONTRAST [place] [facility] [place]    KNEE  2017    KNEE Bilateral     scope    KNEE ARTHROPLASTY Right 02/2023    KNEE  SURGERY  07/18/2017    Knee Surgery    KNEE SURGERY Left 12/12/2023    TONSILLECTOMY           Anesthesia Plan    History of general anesthesia?: yes  History of complications of general anesthesia?: no    ASA 2     general     The patient is not a current smoker.  Patient was not previously instructed to abstain from smoking on day of procedure.  Patient did not smoke on day of procedure.  Education provided regarding risk of obstructive sleep apnea.  intravenous induction   Anesthetic plan and risks discussed with patient.    Plan discussed with CRNA.

## 2024-02-07 NOTE — H&P
History Of Present Illness  Lorenzo Peraza is a 63 y.o. male presenting with acute cholecystitis..  He had severe right upper quadrant pain and came to the emergency department.  He was evaluated with a CT and ultrasound which revealed gallstones and signs of acute cholecystitis.  He was brought in for observation to the surgical service and the decision was made to take him to the operating room for laparoscopic cholecystectomy.     Past Medical History  Past Medical History:   Diagnosis Date    Body mass index (BMI) 26.0-26.9, adult 05/14/2021    BMI 26.0-26.9,adult    Heart murmur     since age 16 no intervention    Hyperlipidemia     Left knee pain     Osteoarthritis        Surgical History  Past Surgical History:   Procedure Laterality Date    COLONOSCOPY      x 2    CT HEAD ANGIO W AND WO IV CONTRAST  06/22/2021    CT HEAD ANGIO W AND WO IV CONTRAST 6/22/2021 [facility] [place] [facility]    KNEE  2017    KNEE Bilateral     scope    KNEE ARTHROPLASTY Right 02/2023    KNEE SURGERY  07/18/2017    Knee Surgery    KNEE SURGERY Left 12/12/2023    TONSILLECTOMY          Social History  He reports that he has never smoked. He has never been exposed to tobacco smoke. He has never used smokeless tobacco. He reports that he does not currently use alcohol. He reports that he does not use drugs.    Family History  Family History   Problem Relation Name Age of Onset    Liver cancer Mother      Colon cancer Mother      Diabetes Mother      Lung disease Father      No Known Problems Sister      No Known Problems Sister      No Known Problems Sister      No Known Problems Brother      No Known Problems Daughter      No Known Problems Daughter      No Known Problems Son          Allergies  Cortisone    Review of Systems   Constitutional:  Negative for appetite change, chills, diaphoresis, fatigue, fever and unexpected weight change.   HENT:  Negative for dental problem, ear pain, hearing loss, sore throat and trouble swallowing.     Eyes:  Negative for discharge, redness (No subjective scleral icterus) and visual disturbance.   Respiratory:  Negative for cough, chest tightness, shortness of breath and wheezing.    Cardiovascular:  Negative for chest pain and palpitations.   Gastrointestinal:  Positive for abdominal pain and nausea. Negative for anal bleeding, blood in stool, constipation, diarrhea, rectal pain and vomiting.   Endocrine: Negative for cold intolerance, heat intolerance, polydipsia and polyuria.   Genitourinary:  Negative for difficulty urinating, dysuria, enuresis, frequency, hematuria and urgency.   Musculoskeletal:  Negative for arthralgias, back pain, joint swelling and neck pain.   Skin:  Negative for color change and rash.   Allergic/Immunologic: Negative for immunocompromised state.   Neurological:  Negative for dizziness, seizures, syncope, weakness, numbness and headaches.   Hematological:  Negative for adenopathy. Does not bruise/bleed easily.   Psychiatric/Behavioral:  Negative for agitation, dysphoric mood and hallucinations. The patient is not nervous/anxious.         Physical Exam     Last Recorded Vitals  Blood pressure 104/62, pulse 50, temperature 36.9 °C (98.4 °F), temperature source Oral, resp. rate 16, weight 86.2 kg (190 lb), SpO2 96 %.    Relevant Results  Results for orders placed or performed during the hospital encounter of 02/07/24 (from the past 24 hour(s))   ECG 12 Lead   Result Value Ref Range    Ventricular Rate 61 BPM    Atrial Rate 61 BPM    TX Interval 180 ms    QRS Duration 74 ms    QT Interval 426 ms    QTC Calculation(Bazett) 428 ms    P Axis 70 degrees    R Axis 50 degrees    T Axis 70 degrees    QRS Count 10 beats    Q Onset 227 ms    P Onset 137 ms    P Offset 186 ms    T Offset 440 ms    QTC Fredericia 428 ms   CBC   Result Value Ref Range    WBC 5.3 4.4 - 11.3 x10*3/uL    nRBC 0.0 0.0 - 0.0 /100 WBCs    RBC 4.06 (L) 4.50 - 5.90 x10*6/uL    Hemoglobin 11.9 (L) 13.5 - 17.5 g/dL     Hematocrit 36.9 (L) 41.0 - 52.0 %    MCV 91 80 - 100 fL    MCH 29.3 26.0 - 34.0 pg    MCHC 32.2 32.0 - 36.0 g/dL    RDW 12.8 11.5 - 14.5 %    Platelets 198 150 - 450 x10*3/uL   Comprehensive Metabolic Panel   Result Value Ref Range    Glucose 140 (H) 65 - 99 mg/dL    Sodium 136 133 - 145 mmol/L    Potassium 3.9 3.4 - 5.1 mmol/L    Chloride 101 97 - 107 mmol/L    Bicarbonate 25 24 - 31 mmol/L    Urea Nitrogen 17 8 - 25 mg/dL    Creatinine 1.10 0.40 - 1.60 mg/dL    eGFR 76 >60 mL/min/1.73m*2    Calcium 9.1 8.5 - 10.4 mg/dL    Albumin 4.2 3.5 - 5.0 g/dL    Alkaline Phosphatase 144 (H) 35 - 125 U/L    Total Protein 6.8 5.9 - 7.9 g/dL     (H) 5 - 40 U/L    Bilirubin, Total 1.5 (H) 0.1 - 1.2 mg/dL     (H) 5 - 40 U/L    Anion Gap 10 <=19 mmol/L   NT-PROBNP   Result Value Ref Range    PROBNP 174 0 - 177 pg/mL   Serial Troponin, Initial ([facility])   Result Value Ref Range    Troponin T, High Sensitivity 7 <=14 ng/L   Lipase   Result Value Ref Range    Lipase 27 16 - 63 U/L   D-dimer, quantitative   Result Value Ref Range    D-Dimer Non VTE, Quant (mg/L FEU) 2.12 (H) 0.19 - 0.50 mg/L FEU   Serial Troponin, 2 Hour ([facility])   Result Value Ref Range    Troponin T, High Sensitivity 7 <=14 ng/L      US gallbladder    Result Date: 2/7/2024  STUDY: Right Upper Quadrant Ultrasound; 2/7/2024 7:04 PM INDICATION: Elevated LFT and bilirubin with cholelithiasis.  Nausea and abdominal pain x one day. COMPARISON: CT A/P 2/7/2024. ACCESSION NUMBER(S): [id number] ORDERING CLINICIAN: ISAMAR LE TECHNIQUE: Ultrasound of the Right Upper Quadrant. FINDINGS: LIVER: normal echogenicity.   GALLBLADDER: multiple small stones.  There is no pericholecystic fluid or wall thickening.  Sonographic Partida's sign is negative.    BILE DUCTS: The common bile duct measures 0.4 cm.  There is no intrahepatic biliary dilatation.   PANCREAS: not well seen due to overlying bowel gas.  RIGHT KIDNEY:  9.5 cm in length.  Renal cortical  echotexture is normal.  There is no hydronephrosis.  There are no stones.  There are no cysts.    Cholelithiasis. Signed by Vinh Kingsley MD    ECG 12 Lead    Result Date: 2/7/2024  Normal sinus rhythm Normal ECG When compared with ECG of 05-DEC-2023 12:27, MN interval has decreased    CT chest abdomen pelvis w IV contrast    Result Date: 2/7/2024  STUDY: CT Chest, Abdomen, and Pelvis with IV Contrast; 2/7/2024 at 3:32 AM INDICATION: Abdominal pain and elevated D-dimer. COMPARISON: XR chest 2/7/2024, XR chest 1/10/2021. ACCESSION NUMBER(S): [id number] ORDERING CLINICIAN: SON GODINEZ TECHNIQUE: CT of the chest, abdomen, and pelvis was performed.  Contiguous axial images were obtained at 3 mm slice thickness through the chest, abdomen, and pelvis.  Coronal and sagittal reconstructions at 3 mm slice thickness were performed.  Omnipaque 350 75 mL was administered intravenously.  FINDINGS: CHEST: MEDIASTINUM: The heart is normal in size.  Trace pericardial effusion.  Central vascular structures opacify normally.  LUNGS/PLEURA: There is no pleural effusion, pleural thickening, or pneumothorax. The airways are patent. Lungs are clear without consolidation, interstitial disease, or suspicious nodules. LYMPH NODES: Thoracic lymph nodes are not enlarged. ABDOMEN:  LIVER: No hepatomegaly.  Smooth surface contour.  Normal attenuation.  BILE DUCTS: No intrahepatic or extrahepatic biliary ductal dilatation.  GALLBLADDER: Gallbladder is mildly distended with small gallstone.  Mild pericholecystic edema is suggested.  STOMACH: No abnormalities identified.  PANCREAS: No masses or ductal dilatation.  SPLEEN: No splenomegaly or focal splenic lesion.  ADRENAL GLANDS: No thickening or nodules.  KIDNEYS AND URETERS: Kidneys are normal in size and location.  Subcentimeter low-attenuation left renal lesions are likely cysts but too small adequately characterize.  No renal or ureteral calculi.  PELVIS:  BLADDER: No abnormalities  identified.  REPRODUCTIVE ORGANS: No abnormalities identified.  BOWEL: Distal colonic diverticulosis without CT evidence for acute diverticulitis.  No evidence of bowel obstruction.  Appendix is within normal limits.  VESSELS: No abnormalities identified.  Abdominal aorta is normal in caliber.  PERITONEUM/RETROPERITONEUM/LYMPH NODES: No free fluid.  No pneumoperitoneum. No lymphadenopathy.  ABDOMINAL WALL: No abnormalities identified. SOFT TISSUES: No abnormalities identified.  BONES: Bilateral pars defects at L5 without associated listhesis.  No acute fracture or aggressive osseous lesion.    No distinct acute intrathoracic, intra-abdominal, or pelvic process identified. Distal colonic diverticulosis without CT evidence for acute diverticulitis. Cholelithiasis with mildly distended appearance of the gallbladder. Consider additional dedicated ultrasound evaluation as clinically warranted. Trace pericardial effusion, nonspecific in nature. Signed by Michael Silva MD    XR chest 1 view    Result Date: 2/7/2024  STUDY: Chest Radiograph;  02/07/2024 1:16 AM INDICATION: Chest pain. COMPARISON: 01/10/2021 XR Chest ACCESSION NUMBER(S): [id number] ORDERING CLINICIAN: SON GODINEZ TECHNIQUE:  Frontal chest (three images) was obtained at 01:16 hours. FINDINGS: Both lungs are clear. The heart and mediastinum are of normal size and contour.  No pleural effusion.  No pneumothorax. No acute bony abnormality identified.    No findings of an acute cardiopulmonary process. Signed by Param Estrada MD             Assessment/Plan   Acute cholecystitis with obstruction    Lap adrian with grams today.  The risks, benefits, and alternatives were discussed with the patient in detail.  Questions were answered and informed consent was obtained.        Stas Garcia MD

## 2024-02-08 ENCOUNTER — PHARMACY VISIT (OUTPATIENT)
Dept: PHARMACY | Facility: CLINIC | Age: 64
End: 2024-02-08
Payer: MEDICARE

## 2024-02-08 ENCOUNTER — APPOINTMENT (OUTPATIENT)
Dept: ORTHOPEDIC SURGERY | Facility: CLINIC | Age: 64
End: 2024-02-08
Payer: COMMERCIAL

## 2024-02-08 VITALS
WEIGHT: 190 LBS | SYSTOLIC BLOOD PRESSURE: 125 MMHG | OXYGEN SATURATION: 96 % | HEART RATE: 66 BPM | HEIGHT: 73 IN | TEMPERATURE: 98.4 F | RESPIRATION RATE: 16 BRPM | DIASTOLIC BLOOD PRESSURE: 64 MMHG | BODY MASS INDEX: 25.18 KG/M2

## 2024-02-08 PROBLEM — Z96.652 HISTORY OF TOTAL LEFT KNEE REPLACEMENT: Status: ACTIVE | Noted: 2024-02-08

## 2024-02-08 PROBLEM — M23.40 LOOSE BODY OF KNEE: Status: ACTIVE | Noted: 2023-04-06

## 2024-02-08 LAB
ALBUMIN SERPL-MCNC: 3.5 G/DL (ref 3.5–5)
ALP BLD-CCNC: 126 U/L (ref 35–125)
ALT SERPL-CCNC: 236 U/L (ref 5–40)
ANION GAP SERPL CALC-SCNC: 9 MMOL/L
AST SERPL-CCNC: 90 U/L (ref 5–40)
BILIRUB SERPL-MCNC: 1.2 MG/DL (ref 0.1–1.2)
BUN SERPL-MCNC: 11 MG/DL (ref 8–25)
CALCIUM SERPL-MCNC: 8.8 MG/DL (ref 8.5–10.4)
CHLORIDE SERPL-SCNC: 108 MMOL/L (ref 97–107)
CO2 SERPL-SCNC: 22 MMOL/L (ref 24–31)
CREAT SERPL-MCNC: 1.2 MG/DL (ref 0.4–1.6)
EGFRCR SERPLBLD CKD-EPI 2021: 68 ML/MIN/1.73M*2
GLUCOSE SERPL-MCNC: 103 MG/DL (ref 65–99)
HOLD SPECIMEN: NORMAL
LABORATORY COMMENT REPORT: NORMAL
PATH REPORT.FINAL DX SPEC: NORMAL
PATH REPORT.GROSS SPEC: NORMAL
PATH REPORT.RELEVANT HX SPEC: NORMAL
PATH REPORT.TOTAL CANCER: NORMAL
POTASSIUM SERPL-SCNC: 4.3 MMOL/L (ref 3.4–5.1)
PROT SERPL-MCNC: 5.8 G/DL (ref 5.9–7.9)
SODIUM SERPL-SCNC: 139 MMOL/L (ref 133–145)

## 2024-02-08 PROCEDURE — G0378 HOSPITAL OBSERVATION PER HR: HCPCS

## 2024-02-08 PROCEDURE — 80053 COMPREHEN METABOLIC PANEL: CPT | Performed by: SURGERY

## 2024-02-08 PROCEDURE — 2500000004 HC RX 250 GENERAL PHARMACY W/ HCPCS (ALT 636 FOR OP/ED): Performed by: SURGERY

## 2024-02-08 PROCEDURE — 36415 COLL VENOUS BLD VENIPUNCTURE: CPT | Performed by: SURGERY

## 2024-02-08 PROCEDURE — 96361 HYDRATE IV INFUSION ADD-ON: CPT | Performed by: SURGERY

## 2024-02-08 PROCEDURE — RXMED WILLOW AMBULATORY MEDICATION CHARGE

## 2024-02-08 PROCEDURE — 99024 POSTOP FOLLOW-UP VISIT: CPT | Performed by: NURSE PRACTITIONER

## 2024-02-08 PROCEDURE — 2500000001 HC RX 250 WO HCPCS SELF ADMINISTERED DRUGS (ALT 637 FOR MEDICARE OP): Performed by: SURGERY

## 2024-02-08 RX ORDER — SODIUM CHLORIDE, SODIUM LACTATE, POTASSIUM CHLORIDE, CALCIUM CHLORIDE 600; 310; 30; 20 MG/100ML; MG/100ML; MG/100ML; MG/100ML
100 INJECTION, SOLUTION INTRAVENOUS CONTINUOUS
Status: DISCONTINUED | OUTPATIENT
Start: 2024-02-08 | End: 2024-02-08

## 2024-02-08 RX ORDER — TIZANIDINE 2 MG/1
2 TABLET ORAL EVERY 6 HOURS PRN
Qty: 21 TABLET | Refills: 0 | Status: SHIPPED | OUTPATIENT
Start: 2024-02-08

## 2024-02-08 RX ORDER — IBUPROFEN 600 MG/1
600 TABLET ORAL EVERY 8 HOURS PRN
Qty: 28 TABLET | Refills: 0 | Status: SHIPPED | OUTPATIENT
Start: 2024-02-08

## 2024-02-08 RX ORDER — SODIUM CHLORIDE, SODIUM LACTATE, POTASSIUM CHLORIDE, CALCIUM CHLORIDE 600; 310; 30; 20 MG/100ML; MG/100ML; MG/100ML; MG/100ML
100 INJECTION, SOLUTION INTRAVENOUS CONTINUOUS
Status: DISCONTINUED | OUTPATIENT
Start: 2024-02-08 | End: 2024-02-08 | Stop reason: HOSPADM

## 2024-02-08 RX ORDER — OXYCODONE HYDROCHLORIDE 5 MG/1
5 TABLET ORAL EVERY 6 HOURS PRN
Qty: 12 TABLET | Refills: 0 | Status: SHIPPED | OUTPATIENT
Start: 2024-02-08

## 2024-02-08 RX ORDER — ACETAMINOPHEN 325 MG/1
650 TABLET ORAL EVERY 6 HOURS
Start: 2024-02-08

## 2024-02-08 RX ADMIN — IBUPROFEN 600 MG: 600 TABLET, FILM COATED ORAL at 02:09

## 2024-02-08 RX ADMIN — ACETAMINOPHEN 650 MG: 325 TABLET ORAL at 02:08

## 2024-02-08 RX ADMIN — IBUPROFEN 600 MG: 600 TABLET, FILM COATED ORAL at 08:28

## 2024-02-08 RX ADMIN — SODIUM CHLORIDE, SODIUM LACTATE, POTASSIUM CHLORIDE, AND CALCIUM CHLORIDE 100 ML/HR: 600; 310; 30; 20 INJECTION, SOLUTION INTRAVENOUS at 00:31

## 2024-02-08 RX ADMIN — ENOXAPARIN SODIUM 40 MG: 40 INJECTION SUBCUTANEOUS at 08:28

## 2024-02-08 RX ADMIN — ACETAMINOPHEN 650 MG: 325 TABLET ORAL at 08:27

## 2024-02-08 ASSESSMENT — COGNITIVE AND FUNCTIONAL STATUS - GENERAL
MOBILITY SCORE: 24
MOBILITY SCORE: 24
DAILY ACTIVITIY SCORE: 24
DAILY ACTIVITIY SCORE: 24

## 2024-02-08 ASSESSMENT — PAIN - FUNCTIONAL ASSESSMENT
PAIN_FUNCTIONAL_ASSESSMENT: 0-10
PAIN_FUNCTIONAL_ASSESSMENT: 0-10

## 2024-02-08 ASSESSMENT — PAIN SCALES - GENERAL
PAINLEVEL_OUTOF10: 0 - NO PAIN
PAINLEVEL_OUTOF10: 0 - NO PAIN

## 2024-02-08 ASSESSMENT — PAIN SCALES - WONG BAKER: WONGBAKER_NUMERICALRESPONSE: HURTS LITTLE BIT

## 2024-02-08 NOTE — NURSING NOTE
No change from previous assessment. Pt resting comfortably in bed with call light in reach. Dressings intact to ABD.

## 2024-02-08 NOTE — NURSING NOTE
ASSUMED CARE OF PT NO COMPLAINTS VOICED. PT REQUESTING TO ADVANCE DIET PT INFORMED NEEDS TO PASS GAS FIRST. PT NOT PASSING GAS YET

## 2024-02-08 NOTE — CARE PLAN
The patient's goals for the shift include      The clinical goals for the shift include PAIN FREE    Over the shift, the patient did not make progress toward the following goals. Barriers to progression include DC TO HOME . Recommendations to address these barriers include DC TODAY.

## 2024-02-08 NOTE — DISCHARGE SUMMARY
Discharge Diagnosis  Calculus of gallbladder with acute cholecystitis and obstruction    Issues Requiring Follow-Up  Follow up with Dr. Garcia as outpatient as scheduled below.     Test Results Pending At Discharge  Pending Labs       Order Current Status    Surgical Pathology Exam In process            Hospital Course   2/7/24 Underwent laparoscopic cholecystectomy with intraoperative cholangiogram.     Pertinent Physical Exam At Time of Discharge  Physical Exam  Constitutional:       Appearance: Normal appearance.   HENT:      Head: Normocephalic and atraumatic.      Right Ear: Tympanic membrane normal.      Nose: Nose normal.      Mouth/Throat:      Mouth: Mucous membranes are moist.   Eyes:      Pupils: Pupils are equal, round, and reactive to light.   Cardiovascular:      Rate and Rhythm: Normal rate and regular rhythm.      Pulses: Normal pulses.   Pulmonary:      Effort: Pulmonary effort is normal.      Breath sounds: Normal breath sounds.   Abdominal:      General: Bowel sounds are normal. There is no distension.      Palpations: Abdomen is soft.      Tenderness: There is no abdominal tenderness. There is no guarding.      Hernia: No hernia is present.      Comments: Abdominal lap sites with dressings removed. Steri strips are intact.    Genitourinary:     Rectum: Normal.   Musculoskeletal:         General: Normal range of motion.   Skin:     General: Skin is warm and dry.   Neurological:      General: No focal deficit present.      Mental Status: He is alert.   Psychiatric:         Mood and Affect: Mood normal.         Behavior: Behavior normal.         Home Medications     Medication List      START taking these medications     acetaminophen 325 mg tablet; Commonly known as: Tylenol; Take 2 tablets   (650 mg) by mouth every 6 hours.   ibuprofen 600 mg tablet; Take 1 tablet (600 mg) by mouth every 8 hours   if needed for mild pain (1 - 3).   oxyCODONE 5 mg immediate release tablet; Commonly known as:  Roxicodone;   Take 1 tablet (5 mg) by mouth every 6 hours if needed for severe pain (7 -   10).   tiZANidine 2 mg tablet; Commonly known as: Zanaflex; Take 1 tablet (2   mg) by mouth every 6 hours if needed for muscle spasms.       Outpatient Follow-Up  Future Appointments   Date Time Provider Department Center   2/23/2024 11:45 AM Stas Garcia MD [id number] [facility]   3/15/2024  9:30 AM Dutch Ramirez MD [id number] [facility]   5/10/2024  7:30 AM Stewart Wahl, APRN-CNP [id number]       Kiara Turcios APRN-CNP

## 2024-02-08 NOTE — DISCHARGE INSTRUCTIONS
Wound Care  Do not submerge incisions in pool, bath, or hot tub  Do not peel off Dermabond -it will gradually loosen and fall off  ° You may shower with your back to the water and pat incisions dry  Do not apply any lotions, creams, or ointments to your incisions  Activity  Do not push, pull, or lift anything.  Avoid excessive bending and twisting at the waist.  You may walk stairs.  You may sleep in any position that feels comfortable.  You must get up and walk every hour during the day.  If you plan to take a nap during the day, set an alarm for one hour so you will get up and walk around.  Potential Complications   Wound Infection - redness, increased warmth, pain, thick drainage, fever  Blood Clot/Pulmonary Embolism - calf pain or swelling, chest pain, shortness of breath, racing heart, fast breathing  CALL 9-1-1 WITH ANY CHEST PAIN OR SHORTNESS OF BREATH, otherwise call the office with any concerns at [phone].     No bending, twisting, pulling, pushing,  or lifting over 15 pounds for 4 weeks.     Cholecystectomy Discharge Instructions    About this topic  Cholecystectomy is surgery to remove the gallbladder. There are two types of surgeries done for this. You may have had your gallbladder taken out with a laparoscope. This means you have a few small cuts in your belly. Other times, it is taken out through one large cut in your belly. This is called an open procedure.    What care is needed at home?  Ask your doctor what you need to do when you go home. Make sure you ask questions if you do not understand what the doctor says. This way you will know what you need to do.  Talk to your doctor about how to care for your cut site. Ask your doctor about:  When you should change your bandages  When you may take a bath or shower  If you need to be careful with lifting things over 10 pounds (4.5 kg)  When you may go back to your normal activities like work, driving, or sex  Be sure to wash your hands before and  after touching your wound or dressing.  If you had a laparoscopic procedure, you will have a few small cut sites. They may have special tape on them called steri-strips. These will fall off on their own in about 10 days. You can take them off after 10 to 14 days if they have not fallen off. They may also use a skin glue to keep the cut sites together. This will slowly peel off on its own in about 7 to 14 days. Do not pick or peel at them.  If you had an open procedure, you will have one large cut site. It may have sutures or staples. You may also have a drain to get rid of extra fluid. When the drain is taken out, there will be a small cut site.  Keep coughing and doing deep breathing exercises for 7 to 10 days after you go home. This will help prevent lung infections.    What follow-up care is needed?  Your doctor may ask you to make visits to the office to check on your progress. Be sure to keep these visits.  If you have stitches or staples, you will need to have them taken out. Your doctor will often want to do this in 1 to 2 weeks. If the doctor used skin glue, the glue will fall off on its own.  If you have a drain, you will need to have that removed in the office. Your doctor will want to do this in 1 to 2 weeks.  What drugs may be needed?  The doctor may order drugs to:  Help with pain  Fight an infection  Soften stools if you are taking drugs for pain control  Help with upset stomach  Prevent blood clots  Will physical activity be limited?  Try to walk 3 to 4 times each day. Do your best to walk a little farther and longer each day.  You may need to rest for a while. Talk to your doctor before you run, work out, or play sports.  Do light household work only, such as washing dishes or helping with meals.  What changes to diet are needed?  Drink 8 to 10 glasses of fluids each day.  Avoid eating greasy or spicy foods.  Your doctor may suggest a high-fiber diet. Ask your doctor if you need to change your  diet.  What problems could happen?  Bleeding  Infection  Swelling of the pancreas  Injury to small bowel  When do I need to call the doctor?  Signs of infection. These include a fever of 100.4°F (38°C) or higher, chills, very bad sore throat, pain with passing urine, or wound that will not heal.  Signs of wound infection. These include swelling, redness, warmth around the wound; too much pain when touched; yellowish, greenish, or bloody discharge; foul smell coming from the cut site; cut site opens up.  Signs of liver problems like upset stomach or throwing up, belly pain, feeling tired, dark urine, yellow skin or eyes, not hungry.  Bowel movements that are white or gray in color or no bowel movement for 2 to 3 days after surgery  Sudden onset of chest pain, fast heartbeat, breathing problems, and pain or tenderness in your calf could be a sign that a blood clot has traveled to your lungs. Call for emergency help right away.  Helpful tips  When you cough, sneeze, or do deep breathing exercises, press a pillow across your cut to support the muscles to protect the wound and ease pain.  Be active to help healing and prevent blood clots.  Teach Back: Helping You Understand  The Teach Back Method helps you understand the information we are giving you. After you talk with the staff, tell them in your own words what you learned. This helps to make sure the staff has described each thing clearly. It also helps to explain things that may have been confusing. Before going home, make sure you can do these:  I can tell you about my procedure.  I can tell you how to care for my cut site.  I can tell you what I will do if I have swelling, redness, or warmth around my wound.  Last Reviewed Date  2021-05-17  Consumer Information Use and Disclaimer  This generalized information is a limited summary of diagnosis, treatment, and/or medication information. It is not meant to be comprehensive and should be used as a tool to help the  user understand and/or assess potential diagnostic and treatment options. It does NOT include all information about conditions, treatments, medications, side effects, or risks that may apply to a specific patient. It is not intended to be medical advice or a substitute for the medical advice, diagnosis, or treatment of a health care provider based on the health care provider's examination and assessment of a patient’s specific and unique circumstances. Patients must speak with a health care provider for complete information about their health, medical questions, and treatment options, including any risks or benefits regarding use of medications. This information does not endorse any treatments or medications as safe, effective, or approved for treating a specific patient. [employer] and its affiliates disclaim any warranty or liability relating to this information or the use thereof. The use of this information is governed by the Terms of Use, available at https://www.[employer].com/en/know/clinical-effectiveness-terms  Copyright © 2024 [employer] and its affiliates and/or licensors. All rights reserved.

## 2024-02-23 ENCOUNTER — OFFICE VISIT (OUTPATIENT)
Dept: SURGERY | Facility: CLINIC | Age: 64
End: 2024-02-23
Payer: COMMERCIAL

## 2024-02-23 VITALS
BODY MASS INDEX: 24.39 KG/M2 | DIASTOLIC BLOOD PRESSURE: 64 MMHG | WEIGHT: 184 LBS | TEMPERATURE: 97.7 F | HEART RATE: 68 BPM | SYSTOLIC BLOOD PRESSURE: 102 MMHG | HEIGHT: 73 IN | OXYGEN SATURATION: 96 %

## 2024-02-23 DIAGNOSIS — Z09 POSTOPERATIVE EXAMINATION: Primary | ICD-10-CM

## 2024-02-23 PROCEDURE — 1036F TOBACCO NON-USER: CPT | Performed by: SURGERY

## 2024-02-23 PROCEDURE — 99024 POSTOP FOLLOW-UP VISIT: CPT | Performed by: SURGERY

## 2024-02-23 ASSESSMENT — PATIENT HEALTH QUESTIONNAIRE - PHQ9
SUM OF ALL RESPONSES TO PHQ9 QUESTIONS 1 AND 2: 0
2. FEELING DOWN, DEPRESSED OR HOPELESS: NOT AT ALL
1. LITTLE INTEREST OR PLEASURE IN DOING THINGS: NOT AT ALL

## 2024-02-23 ASSESSMENT — PAIN SCALES - GENERAL: PAINLEVEL: 0-NO PAIN

## 2024-02-26 NOTE — PROGRESS NOTES
Subjective   Patient ID: Lorenzo Peraza is a 63 y.o. male who presents for Post-op (S/p laparoscopic cholecystectomy.  Pt states he has a discomfort on his right side x 1 week.  When he breathes, it hurts.  States it feels like he has a cracked rib.  Usually just hurts now when he lays down, but it is getting better every day.  ).  The patient is here following laparoscopic cholecystectomy.  He states he still gets some pain at the most lateral right side of the incision.  He is otherwise doing well.  He is eating well and moving his bowels normally.  He denies fever, chills, nausea, vomiting, constipation, or diarrhea.  He states if he puts creamer in his coffee he will have a bowel movement very shortly after drinking.        Review of Systems   All other systems reviewed and are negative.      Objective   Physical Exam  Constitutional:       General: He is not in acute distress.     Appearance: Normal appearance.   Abdominal:      General: Abdomen is flat. Bowel sounds are normal.      Palpations: Abdomen is soft.      Tenderness: There is no abdominal tenderness.   Skin:     General: Skin is warm and dry.      Comments: Wounds clean, dry, intact, healing well.   Neurological:      Mental Status: He is alert.         Assessment/Plan   Diagnoses and all orders for this visit:  Postoperative examination  Patient is doing well following laparoscopic cholecystectomy.  I reminded him dietary recommendations to reduce fat intake following cholecystectomy.  I explained to him that I expect this to resolve within 4 to 6 weeks after surgery.  If he finds that his dietary restrictions continue, he should follow-up for [facility].  He may be otherwise discharged from the current problem, and may follow-up with me as needed for any surgical problem or question that may arise.       Stas Garcia MD 02/26/24 12:38 PM

## 2024-03-01 ENCOUNTER — APPOINTMENT (OUTPATIENT)
Dept: ORTHOPEDIC SURGERY | Facility: CLINIC | Age: 64
End: 2024-03-01
Payer: COMMERCIAL

## 2024-03-13 ENCOUNTER — TELEPHONE (OUTPATIENT)
Dept: SURGERY | Facility: CLINIC | Age: 64
End: 2024-03-13
Payer: COMMERCIAL

## 2024-03-13 NOTE — TELEPHONE ENCOUNTER
Patient had Lap [place] 02/07/2024 with Dr. Garcia at [facility]. Patient states he is still having pain in his rib area on his right side. Patient would like to be contacted at mobile number to discuss questions/concerns.

## 2024-03-13 NOTE — TELEPHONE ENCOUNTER
Spoke with patient about pain.  Patient describes the pain as intermittent discomfort on the right side near the incision.  Patient denies any fever, nausea, vomiting, swelling, warmth, or redness at site.    I spoke with Dr. Thompson who stated that it can take up to 3 months after surgery for this pain to subside.  Dr. Thompson recommended patient use ice and lidocaine patches for pain relief.      Informed patient of Dr. Thompson's recommendations.  Instructed patient to call office in May if he is still having the pain.  Patient verbalized understanding and denied any other questions or concerns at this time.

## 2024-03-15 ENCOUNTER — OFFICE VISIT (OUTPATIENT)
Dept: ORTHOPEDIC SURGERY | Facility: CLINIC | Age: 64
End: 2024-03-15
Payer: COMMERCIAL

## 2024-03-15 ENCOUNTER — HOSPITAL ENCOUNTER (OUTPATIENT)
Dept: RADIOLOGY | Facility: CLINIC | Age: 64
Discharge: HOME | End: 2024-03-15
Payer: COMMERCIAL

## 2024-03-15 VITALS — WEIGHT: 184.08 LBS | BODY MASS INDEX: 24.29 KG/M2

## 2024-03-15 DIAGNOSIS — Z96.659 TOTAL KNEE REPLACEMENT STATUS: ICD-10-CM

## 2024-03-15 DIAGNOSIS — Z96.652 HISTORY OF TOTAL LEFT KNEE REPLACEMENT: Primary | ICD-10-CM

## 2024-03-15 DIAGNOSIS — Z96.651 HISTORY OF TOTAL RIGHT KNEE REPLACEMENT: ICD-10-CM

## 2024-03-15 PROCEDURE — 73560 X-RAY EXAM OF KNEE 1 OR 2: CPT | Mod: LEFT SIDE | Performed by: ORTHOPAEDIC SURGERY

## 2024-03-15 PROCEDURE — 99213 OFFICE O/P EST LOW 20 MIN: CPT | Performed by: ORTHOPAEDIC SURGERY

## 2024-03-15 PROCEDURE — 1036F TOBACCO NON-USER: CPT | Performed by: ORTHOPAEDIC SURGERY

## 2024-03-15 PROCEDURE — 73560 X-RAY EXAM OF KNEE 1 OR 2: CPT | Mod: LT

## 2024-03-15 ASSESSMENT — LIFESTYLE VARIABLES: TOTAL SCORE: 0

## 2024-03-15 ASSESSMENT — PATIENT HEALTH QUESTIONNAIRE - PHQ9
1. LITTLE INTEREST OR PLEASURE IN DOING THINGS: NOT AT ALL
2. FEELING DOWN, DEPRESSED OR HOPELESS: NOT AT ALL
SUM OF ALL RESPONSES TO PHQ9 QUESTIONS 1 AND 2: 0

## 2024-03-15 ASSESSMENT — PAIN DESCRIPTION - DESCRIPTORS: DESCRIPTORS: ACHING

## 2024-03-15 ASSESSMENT — ENCOUNTER SYMPTOMS
OCCASIONAL FEELINGS OF UNSTEADINESS: 0
LOSS OF SENSATION IN FEET: 0
DEPRESSION: 0

## 2024-03-15 ASSESSMENT — PAIN SCALES - GENERAL: PAINLEVEL_OUTOF10: 1

## 2024-03-15 ASSESSMENT — PAIN - FUNCTIONAL ASSESSMENT: PAIN_FUNCTIONAL_ASSESSMENT: 0-10

## 2024-03-15 NOTE — PROGRESS NOTES
Subjective      Past Surgical History:   Procedure Laterality Date    COLONOSCOPY      x 2    CT HEAD ANGIO W AND WO IV CONTRAST  06/22/2021    CT HEAD ANGIO W AND WO IV CONTRAST 6/22/2021 [facility]    KNEE  2017    KNEE Bilateral     scope    KNEE ARTHROPLASTY Right 02/2023    KNEE SURGERY  07/18/2017    Knee Surgery    KNEE SURGERY Left 12/12/2023    TONSILLECTOMY            Patient History      This patient is s/p left total knee replacement done by Me on 12/12/2023. They present today and state that their left knee pain is 1/10. They are slowly resuming their normal activities of daily living. He is working with physical therapy. They deny chest pain, shortness of breath.     There were no vitals taken for this visit.     ROS  CARDIOLOGY:   Negative for chest pain, shortness of breath.   RESPIRATORY:   Negative for chest pain, shortness of breath.   MUSCULOSKELETAL:   See HPI for details.   NEUROLOGY:   Negative for tingling, numbness, weakness.      Physical exam: Left knee: Incision is clean dry and well healed. Patient has painless ROM from 0-120 degrees. Nontender in the left calf. Neurovascular is intact. The patient is seen today walking independently with a stable gait.     XR knee left 1-2 views  done and read in the office today show that there is a left total knee replacement in overall satisfactory position and alignment.    Result Date: 12/30/2023  Interpreted By:  Ovidio Radford, STUDY: XR KNEE LEFT 1-2 VIEWS;  12/29/2023 12:42 pm   INDICATION: Signs/Symptoms:TOTAL KNEE.   COMPARISON: 12/12/2023   ACCESSION NUMBER(S): [id number]   ORDERING CLINICIAN: SYL LANGFORD   FINDINGS: Bony structures:  Intact   Joint spaces:  Status post total knee arthroplasty as on the prior exam without hardware loosening. 2 tibial apophyseal staples are also present and also without loosening and similar to the prior exam.   Soft tissues:  Resolution of previous postoperative emphysema.   Other:  None  significant       No acute findings.   MACRO: None   Signed by: Ovidio Radford 12/30/2023 7:38 PM Dictation workstation:   [id number]    XR knee left 1-2 views    Result Date: 12/12/2023  Interpreted By:  Oneyda Snow, STUDY: XR KNEE LEFT 1-2 VIEWS; ;  12/12/2023 11:49 am   INDICATION: Signs/Symptoms:Post-op knee.   COMPARISON: 10/06/2023.   ACCESSION NUMBER(S): [id number]   ORDERING CLINICIAN: SYL LANGFORD   FINDINGS: Post total left knee arthroplasty. Hardware appears intact. Postsurgical changes include edema/air about the left knee joint. No suspicious osseous findings.       Acceptable postsurgical changes from total left knee arthroplasty.     MACRO: None   Signed by: Oneyda Snow 12/12/2023 12:54 PM Dictation workstation:   [id number]    ECG 12 lead    Result Date: 12/6/2023  Sinus bradycardia with 1st degree AV block Otherwise normal ECG Confirmed by Lam Manley ([id number]) on 12/6/2023 9:03:50 PM      Lorenzo was seen today for post-op.  Diagnoses and all orders for this visit:  Chronic pain of left knee (Primary)  History of total knee arthroplasty, left  Options are discussed with the patient in detail. The patient is instructed regarding  total knee infection precautions, activity modification and risk for further injury with falling or trauma and to use a cane  As needed for support, ice, provider directed at home gentle strengthening and ROM exercises, and the appropriate use of Tylenol as needed for pain with its potential adverse reactions and side effects. The patient understands. Return as needed. Please note that this report has been produced using speech recognition software.  It may contain errors related to grammar, punctuation or spelling.  Electronically signed, but not reviewed.

## 2024-03-15 NOTE — PATIENT INSTRUCTIONS
Continue to rest, ice and elevate your knee.  Continue your pain regimen.     Remember to call our office for antibiotics before dental work.   Use a cane or walker for support.   Do not kneel, twist, or squat and avoid heavy lifting.   Return as needed.

## 2024-04-01 DIAGNOSIS — Z96.652 HISTORY OF TOTAL KNEE ARTHROPLASTY, LEFT: Primary | ICD-10-CM

## 2024-04-01 RX ORDER — AMOXICILLIN 500 MG/1
2000 TABLET, FILM COATED ORAL ONCE
Qty: 4 TABLET | Refills: 0 | Status: CANCELLED | OUTPATIENT
Start: 2024-04-01 | End: 2024-04-01

## 2024-04-01 RX ORDER — AMOXICILLIN 500 MG/1
2000 TABLET, FILM COATED ORAL ONCE
Qty: 4 TABLET | Refills: 0 | Status: SHIPPED | OUTPATIENT
Start: 2024-04-01 | End: 2024-04-01

## 2024-10-02 ENCOUNTER — OFFICE VISIT (OUTPATIENT)
Dept: PRIMARY CARE | Facility: CLINIC | Age: 64
End: 2024-10-02
Payer: COMMERCIAL

## 2024-10-02 VITALS
WEIGHT: 189 LBS | HEIGHT: 73 IN | OXYGEN SATURATION: 98 % | SYSTOLIC BLOOD PRESSURE: 131 MMHG | BODY MASS INDEX: 25.05 KG/M2 | HEART RATE: 59 BPM | DIASTOLIC BLOOD PRESSURE: 85 MMHG

## 2024-10-02 DIAGNOSIS — M79.10 MUSCLE PAIN: ICD-10-CM

## 2024-10-02 DIAGNOSIS — E78.5 HYPERLIPIDEMIA, UNSPECIFIED HYPERLIPIDEMIA TYPE: ICD-10-CM

## 2024-10-02 DIAGNOSIS — R01.1 HEART MURMUR: ICD-10-CM

## 2024-10-02 DIAGNOSIS — I34.0 NONRHEUMATIC MITRAL VALVE REGURGITATION: ICD-10-CM

## 2024-10-02 DIAGNOSIS — R07.81 RIB PAIN ON LEFT SIDE: Primary | ICD-10-CM

## 2024-10-02 ASSESSMENT — LIFESTYLE VARIABLES
HOW OFTEN DO YOU HAVE A DRINK CONTAINING ALCOHOL: NEVER
SKIP TO QUESTIONS 9-10: 1
HOW MANY STANDARD DRINKS CONTAINING ALCOHOL DO YOU HAVE ON A TYPICAL DAY: PATIENT DOES NOT DRINK
AUDIT-C TOTAL SCORE: 0
HOW OFTEN DO YOU HAVE SIX OR MORE DRINKS ON ONE OCCASION: NEVER

## 2024-10-02 ASSESSMENT — ENCOUNTER SYMPTOMS
DEPRESSION: 0
OCCASIONAL FEELINGS OF UNSTEADINESS: 0
LOSS OF SENSATION IN FEET: 0

## 2024-10-02 ASSESSMENT — COLUMBIA-SUICIDE SEVERITY RATING SCALE - C-SSRS
6. HAVE YOU EVER DONE ANYTHING, STARTED TO DO ANYTHING, OR PREPARED TO DO ANYTHING TO END YOUR LIFE?: NO
2. HAVE YOU ACTUALLY HAD ANY THOUGHTS OF KILLING YOURSELF?: NO
1. IN THE PAST MONTH, HAVE YOU WISHED YOU WERE DEAD OR WISHED YOU COULD GO TO SLEEP AND NOT WAKE UP?: NO

## 2024-10-02 ASSESSMENT — PAIN SCALES - GENERAL: PAINLEVEL: 2

## 2024-10-02 NOTE — PROGRESS NOTES
"Subjective   Patient ID: Lorenzo Peraza is a 64 y.o. male who presents for Muscle Pain (Patient is experiencing what seems to be like muscle pain in the area of his left rib. Patient states it is more of a discomfort then pain, but it does wake him up out of his sleep. ).    HPI patient presents to clinic approximately after 2 years for follow-up visit on has history of DJD of lumbar spine, mitral regurgitation, dyslipidemia colonic polyp and allergic rhinitis. He underwent laparoscopic cholecystectomy on 2/7/2024 secondary to acute cholecystitis, and left total knee arthroplasty on 12/12/2023 secondary to osteoarthritis of left knee.  He has been complaining of left-sided chest wall muscle pain going on for the past 1 week.  He is rating his pain as 8 out of 10 and also woke him up from sleep few days ago.  He denies any fever, rashes, shortness of breath, syncope and diaphoresis.  EKG done in the office shows sinus bradycardia at 50 bpm with normal axis normal intervals without any ischemic changes.       Review of Systems   Constitutional: Negative.    HENT: Negative.     Eyes: Negative.    Respiratory: Negative.     Cardiovascular:  Positive for chest pain.   Gastrointestinal: Negative.    Endocrine: Negative.    Genitourinary: Negative.    Musculoskeletal: Negative.    Skin: Negative.    Allergic/Immunologic: Negative.    Neurological: Negative.    Hematological: Negative.    Psychiatric/Behavioral: Negative.         Objective   /85 (BP Location: Right arm, Patient Position: Sitting)   Pulse 59   Ht 1.854 m (6' 1\")   Wt 85.7 kg (189 lb)   SpO2 98%   BMI 24.94 kg/m²     Physical Exam  Constitutional:       Appearance: Normal appearance. He is normal weight.   HENT:      Right Ear: Tympanic membrane normal.      Left Ear: Tympanic membrane and ear canal normal.      Nose: Nose normal.   Neck:      Vascular: No carotid bruit.   Cardiovascular:      Rate and Rhythm: Normal rate.   Pulmonary:      " Effort: No respiratory distress.      Breath sounds: No stridor. No wheezing.   Abdominal:      Palpations: Abdomen is soft.      Tenderness: There is no abdominal tenderness. There is no guarding or rebound.   Skin:     Coloration: Skin is not jaundiced.      Findings: No bruising.   Neurological:      General: No focal deficit present.      Mental Status: He is alert and oriented to person, place, and time.   Psychiatric:         Mood and Affect: Mood normal.         Assessment/Plan   Assessment & Plan  Rib pain on left side  Patient advised to take Tylenol or Advil for rib pain.  He is also given reassurance.  Orders:    ECG 12 lead (Clinic Performed)    Muscle pain  He is refusing to have blood work done.  He is also advised to consider influenza vaccination for health maintenance.       Heart murmur  He will be scheduled for 2D echocardiogram regarding heart murmur and history of mitral valve regurgitation.  Orders:    ECG 12 lead (Clinic Performed)    Transthoracic Echo (TTE) Complete; Future    Nonrheumatic mitral valve regurgitation  To check 2D echocardiogram.  He is also advised to consider CT cardiac scoring done.  He is also given reassurance  Orders:    Transthoracic Echo (TTE) Complete; Future    Hyperlipidemia, unspecified hyperlipidemia type  He is refusing to take cholesterol-lowering medication.  He is advised to follow low-cholesterol diet.  Orders:    ECG 12 lead (Clinic Performed)

## 2024-10-02 NOTE — ASSESSMENT & PLAN NOTE
He is refusing to take cholesterol-lowering medication.  He is advised to follow low-cholesterol diet.  Orders:    ECG 12 lead (Clinic Performed)

## 2024-10-02 NOTE — ASSESSMENT & PLAN NOTE
He will be scheduled for 2D echocardiogram regarding heart murmur and history of mitral valve regurgitation.  Orders:    ECG 12 lead (Clinic Performed)    Transthoracic Echo (TTE) Complete; Future

## 2024-10-05 ASSESSMENT — ENCOUNTER SYMPTOMS
ALLERGIC/IMMUNOLOGIC NEGATIVE: 1
GASTROINTESTINAL NEGATIVE: 1
PSYCHIATRIC NEGATIVE: 1
EYES NEGATIVE: 1
RESPIRATORY NEGATIVE: 1
MUSCULOSKELETAL NEGATIVE: 1
HEMATOLOGIC/LYMPHATIC NEGATIVE: 1
CONSTITUTIONAL NEGATIVE: 1
NEUROLOGICAL NEGATIVE: 1
ENDOCRINE NEGATIVE: 1

## 2024-10-15 ENCOUNTER — TELEPHONE (OUTPATIENT)
Dept: PRIMARY CARE | Facility: CLINIC | Age: 64
End: 2024-10-15
Payer: COMMERCIAL

## 2024-10-15 ENCOUNTER — HOSPITAL ENCOUNTER (OUTPATIENT)
Dept: CARDIOLOGY | Facility: CLINIC | Age: 64
Discharge: HOME | End: 2024-10-15
Payer: COMMERCIAL

## 2024-10-15 DIAGNOSIS — R01.1 HEART MURMUR: ICD-10-CM

## 2024-10-15 DIAGNOSIS — I34.0 NONRHEUMATIC MITRAL VALVE REGURGITATION: ICD-10-CM

## 2024-10-15 PROCEDURE — 93306 TTE W/DOPPLER COMPLETE: CPT | Performed by: INTERNAL MEDICINE

## 2024-10-15 PROCEDURE — 93306 TTE W/DOPPLER COMPLETE: CPT

## 2024-10-16 ENCOUNTER — TELEPHONE (OUTPATIENT)
Dept: PRIMARY CARE | Facility: CLINIC | Age: 64
End: 2024-10-16
Payer: COMMERCIAL

## 2024-10-16 DIAGNOSIS — I34.0 NONRHEUMATIC MITRAL VALVE REGURGITATION: Primary | ICD-10-CM

## 2024-10-16 LAB
AORTIC VALVE MEAN GRADIENT: 3.6 MMHG
AORTIC VALVE PEAK VELOCITY: 1.28 M/S
AV PEAK GRADIENT: 6.5 MMHG
AVA (PEAK VEL): 3.3 CM2
AVA (VTI): 3.48 CM2
EJECTION FRACTION APICAL 4 CHAMBER: 65.7
EJECTION FRACTION: 63 %
LEFT ATRIUM VOLUME AREA LENGTH INDEX BSA: 32 ML/M2
LEFT VENTRICLE INTERNAL DIMENSION DIASTOLE: 5.38 CM (ref 3.5–6)
LEFT VENTRICULAR OUTFLOW TRACT DIAMETER: 2.21 CM
MITRAL VALVE E/A RATIO: 0.79
RIGHT VENTRICLE FREE WALL PEAK S': 13.34 CM/S
TRICUSPID ANNULAR PLANE SYSTOLIC EXCURSION: 2.7 CM

## 2024-10-28 ENCOUNTER — OFFICE VISIT (OUTPATIENT)
Dept: CARDIOLOGY | Facility: CLINIC | Age: 64
End: 2024-10-28
Payer: COMMERCIAL

## 2024-10-28 VITALS
DIASTOLIC BLOOD PRESSURE: 77 MMHG | HEART RATE: 61 BPM | WEIGHT: 187 LBS | OXYGEN SATURATION: 99 % | BODY MASS INDEX: 24.78 KG/M2 | SYSTOLIC BLOOD PRESSURE: 129 MMHG | HEIGHT: 73 IN | RESPIRATION RATE: 16 BRPM

## 2024-10-28 DIAGNOSIS — I34.0 NONRHEUMATIC MITRAL VALVE REGURGITATION: Primary | ICD-10-CM

## 2024-10-28 DIAGNOSIS — D50.9 IRON DEFICIENCY ANEMIA, UNSPECIFIED IRON DEFICIENCY ANEMIA TYPE: ICD-10-CM

## 2024-10-28 DIAGNOSIS — R01.1 HEART MURMUR: ICD-10-CM

## 2024-10-28 PROCEDURE — 1036F TOBACCO NON-USER: CPT | Performed by: NURSE PRACTITIONER

## 2024-10-28 PROCEDURE — 99204 OFFICE O/P NEW MOD 45 MIN: CPT | Performed by: NURSE PRACTITIONER

## 2024-10-28 PROCEDURE — 3008F BODY MASS INDEX DOCD: CPT | Performed by: NURSE PRACTITIONER

## 2024-10-28 PROCEDURE — 99214 OFFICE O/P EST MOD 30 MIN: CPT | Performed by: NURSE PRACTITIONER

## 2024-10-28 ASSESSMENT — ENCOUNTER SYMPTOMS
LIGHT-HEADEDNESS: 1
ARTHRALGIAS: 1

## 2024-10-29 ENCOUNTER — APPOINTMENT (OUTPATIENT)
Dept: CARDIOLOGY | Facility: HOSPITAL | Age: 64
End: 2024-10-29
Payer: COMMERCIAL

## 2025-01-16 NOTE — NURSING NOTE
Caller: INEZ HALLMAN    Relationship:PATIENT    Callback number: [phone]   Is it ok to leave a message: [x] Yes [] No    Requested medication for samples: ELIQUIS    How much medication does the patient currently have left: ENOUGH FOR TOMORROW NIGHT    Who will be picking up the samples: PATIENT    Do you need information about patient financial assistance for this medication: [] Yes [] No       PT DC'D TO HOME WIFE BROUGHT WC UP AND TOOK PT DOWN PER HERSELF.

## 2025-02-11 NOTE — PROGRESS NOTES
Subjective      Past Surgical History:   Procedure Laterality Date    CHOLECYSTECTOMY  02/07/2024    COLONOSCOPY      x 2    CT HEAD ANGIO W AND WO IV CONTRAST  06/22/2021    CT HEAD ANGIO W AND WO IV CONTRAST 6/22/2021 [facility]    KNEE  2017    KNEE Bilateral     scope    KNEE ARTHROPLASTY Right 02/2023    KNEE SURGERY  07/18/2017    Knee Surgery    KNEE SURGERY Left 12/12/2023    TONSILLECTOMY            Patient History      This patient is s/p left total knee replacement done by Dr. Ramirez on 12/12/2023. They present today and state that their left knee pain is 1/10. They are resuming their normal activities of daily living.al left knee surgery done on 12/12/2023; The pain that he had experienced for 3-4 days is now markedly improved.  He presents today for reevaluation.    There were no vitals taken for this visit.     ROS  CARDIOLOGY:   Negative for chest pain, shortness of breath.   RESPIRATORY:   Negative for chest pain, shortness of breath.   MUSCULOSKELETAL:   See HPI for details.   NEUROLOGY:   Negative for tingling, numbness, weakness.      Physical exam: Left knee: Incision is clean dry and well healed. Patient has painless ROM from 0-120 degrees. Nontender in the left calf. Neurovascular is intact.  Left knee is stable to valgus-varus stressing.  Right knee: There is a well-healed, clean, dry incision from previous right total knee replacement.  Patient has painless range of motion from 0 to 120 degrees.  Right knee position and stability are similar to the left knee.  The patient is seen today walking independently with a stable gait.     XR knee left 1-2 views done and read in the office today are reviewed with the patient in the office today and show that there are right and left total knee replacements in overall satisfactory position and alignment.    Result Date: 12/30/2023  Interpreted By:  Ovidio Radford, STUDY: XR KNEE LEFT 1-2 VIEWS;  12/29/2023 12:42 pm   INDICATION:  Signs/Symptoms:TOTAL KNEE.   COMPARISON: 12/12/2023   ACCESSION NUMBER(S): [id number]   ORDERING CLINICIAN: SYL LANGFORD   FINDINGS: Bony structures:  Intact   Joint spaces:  Status post total knee arthroplasty as on the prior exam without hardware loosening. 2 tibial apophyseal staples are also present and also without loosening and similar to the prior exam.   Soft tissues:  Resolution of previous postoperative emphysema.   Other:  None significant       No acute findings.   MACRO: None   Signed by: Ovidio Radford 12/30/2023 7:38 PM Dictation workstation:   [id number]    XR knee left 1-2 views    Result Date: 12/12/2023  Interpreted By:  Oneyda Snow, STUDY: XR KNEE LEFT 1-2 VIEWS; ;  12/12/2023 11:49 am   INDICATION: Signs/Symptoms:Post-op knee.   COMPARISON: 10/06/2023.   ACCESSION NUMBER(S): [id number]   ORDERING CLINICIAN: SYL LANGFORD   FINDINGS: Post total left knee arthroplasty. Hardware appears intact. Postsurgical changes include edema/air about the left knee joint. No suspicious osseous findings.       Acceptable postsurgical changes from total left knee arthroplasty.     MACRO: None   Signed by: Oneyda Snow 12/12/2023 12:54 PM Dictation workstation:   [id number]    ECG 12 lead    Result Date: 12/6/2023  Sinus bradycardia with 1st degree AV block Otherwise normal ECG Confirmed by Lam Manley ([id number]) on 12/6/2023 9:03:50 PM      Diagnoses and all orders for this visit:  History of total left knee replacement  Chronic pain of left knee  Primary osteoarthritis of left knee  Options are discussed with the patient in detail. The patient is instructed regarding total knee infection precautions, activity modification and risk for further injury with falling or trauma and to use a cane for support, ice, provider directed at home gentle strengthening and ROM exercises, and the appropriate use of Tylenol as needed for pain with its potential adverse reactions and side effects. The patient understands.  Return  as needed. Please note that this report has been produced using speech recognition software.  It may contain errors related to grammar, punctuation or spelling.  Electronically signed, but not reviewed.

## 2025-02-14 ENCOUNTER — OFFICE VISIT (OUTPATIENT)
Dept: ORTHOPEDIC SURGERY | Facility: CLINIC | Age: 65
End: 2025-02-14
Payer: COMMERCIAL

## 2025-02-14 ENCOUNTER — HOSPITAL ENCOUNTER (OUTPATIENT)
Dept: RADIOLOGY | Facility: CLINIC | Age: 65
Discharge: HOME | End: 2025-02-14
Payer: COMMERCIAL

## 2025-02-14 VITALS — WEIGHT: 187 LBS | HEIGHT: 73 IN | BODY MASS INDEX: 24.78 KG/M2

## 2025-02-14 DIAGNOSIS — Z96.652 TOTAL KNEE REPLACEMENT STATUS, LEFT: ICD-10-CM

## 2025-02-14 DIAGNOSIS — M25.562 LEFT KNEE PAIN, UNSPECIFIED CHRONICITY: Primary | ICD-10-CM

## 2025-02-14 DIAGNOSIS — M17.12 PRIMARY OSTEOARTHRITIS OF LEFT KNEE: ICD-10-CM

## 2025-02-14 DIAGNOSIS — Z96.652 HISTORY OF TOTAL LEFT KNEE REPLACEMENT: ICD-10-CM

## 2025-02-14 PROCEDURE — 3008F BODY MASS INDEX DOCD: CPT | Performed by: ORTHOPAEDIC SURGERY

## 2025-02-14 PROCEDURE — 73560 X-RAY EXAM OF KNEE 1 OR 2: CPT | Mod: LT

## 2025-02-14 PROCEDURE — 1036F TOBACCO NON-USER: CPT | Performed by: ORTHOPAEDIC SURGERY

## 2025-02-14 PROCEDURE — 99213 OFFICE O/P EST LOW 20 MIN: CPT | Performed by: ORTHOPAEDIC SURGERY

## 2025-02-14 ASSESSMENT — PAIN SCALES - GENERAL
PAINLEVEL_OUTOF10: 1
PAINLEVEL_OUTOF10: 1

## 2025-02-14 ASSESSMENT — ENCOUNTER SYMPTOMS
OCCASIONAL FEELINGS OF UNSTEADINESS: 0
LOSS OF SENSATION IN FEET: 0
DEPRESSION: 0

## 2025-02-14 ASSESSMENT — LIFESTYLE VARIABLES
HOW OFTEN DO YOU HAVE A DRINK CONTAINING ALCOHOL: NEVER
HOW OFTEN DO YOU HAVE SIX OR MORE DRINKS ON ONE OCCASION: NEVER

## 2025-02-14 ASSESSMENT — PAIN - FUNCTIONAL ASSESSMENT: PAIN_FUNCTIONAL_ASSESSMENT: 0-10

## 2025-02-14 NOTE — PATIENT INSTRUCTIONS
Continue to rest, ice and elevate your knee.  Continue your pain regimen.   We will give a a referral to Physical therapy.   Remember to call our office for antibiotics before dental work.   Use a cane or walker for support.   Do not kneel, twist, or squat and avoid heavy lifting.     Follow up as needed

## (undated) DEVICE — BANDAGE, ELASTIC, ACE, ACE, DOUBLE LENGTH, 6 X 550 IN, LF

## (undated) DEVICE — Device

## (undated) DEVICE — SOLUTION, IRRIGATION, USP, SODIUM CHLORIDE 0.9%, 3000 ML, BAG

## (undated) DEVICE — APPLIER,  LIGACLIP, ENDO ROTATE, ROTATING, 10MM 20M/L, DISP

## (undated) DEVICE — ADHESIVE, SKIN, MASTISOL, 2/3 CC VIAL

## (undated) DEVICE — SUTURE, MONOCRYL, 4-0, 27 IN, PS-2, UNDYED

## (undated) DEVICE — SUTURE, VICRYL, 1, 27 IN, TP-1, VIOLET

## (undated) DEVICE — ACCESS SYS, KII OPTICAL, Z-THREAD, 11X100CM

## (undated) DEVICE — FACE SHIELD, OPTI-COM

## (undated) DEVICE — 83.75IN X 126IN STERI-DRAPE, ISOLATION POUCH W/DRAIN, ADH WINDOW

## (undated) DEVICE — CARE KIT, LAPAROSCOPIC, ADVANCED

## (undated) DEVICE — SLEEVE, KII, Z-THREAD, 5X100CM

## (undated) DEVICE — INTERPULSE HANDPIECE SET, W/RETRACTABLE COAXIAL FAN SPRAY TIP & SUCTION TUBE

## (undated) DEVICE — NEEDLE, INSUFFLATION, 13GAX150MM, DISP

## (undated) DEVICE — GLOVE, SURGICAL, PROTEXIS PI BLUE W/NEUTHERA, 7.5, PF, LF

## (undated) DEVICE — WOUND SYSTEM, DEBRIDEMENT & CLEANING, O.R DUOPAK

## (undated) DEVICE — TROCAR SYSTEM, BALLOON, KII GELPORT, 12 X 100MM

## (undated) DEVICE — SUTURE, VICRYL, 1, 27 IN, CT-1, VIOLET

## (undated) DEVICE — SYRINGE, 10 CC, LUER LOCK

## (undated) DEVICE — STRIP, SKIN CLOSURE, STERI STRIP, REINFORCED, 0.5 X 4 IN

## (undated) DEVICE — DRAPE, SHEET, U, W/ADHESIVE STRIP, IMPERVIOUS, 60 X 70 IN, DISPOSABLE, LF, STERILE

## (undated) DEVICE — E-Z CLEAN ELECTROSURGICAL ELECTRODE

## (undated) DEVICE — SUTURE, VICRYL, 2-0, 36 IN, CT-1, UNDYED

## (undated) DEVICE — GLOVE, SURGICAL, BIOGEL, PI MICRO, SZ-7.0, PF

## (undated) DEVICE — TUBE SET, PNEUMOCLEAR, SMOKE EVACU, HIGH-FLOW

## (undated) DEVICE — CEMENT, MIXEVAC III, 10S BOWL, KNEES

## (undated) DEVICE — TROCAR, KII OPTICAL BLADELESS 5MM Z THREAD 100MM LNGTH

## (undated) DEVICE — SOLUTION, IRRIGATION, X RX SODIUM CHL 0.9%, 1000ML BTL

## (undated) DEVICE — CORD, MONOPOLAR HIGH FREQUENCY, 8MM PLUG, 300CM

## (undated) DEVICE — NEEDLE, HYPODERMIC, MONOJECT, 25 G X 1.5 IN, LUER LOCK HUB, RED

## (undated) DEVICE — DRAPE, ISOLATION, INCISE, W/POUCH, STERI DRAPE, 125 X 83 IN, DISPOSABLE, STERILE

## (undated) DEVICE — GLOVE, SURGICAL, BIOGEL, PI MICRO UNDER GLOVE, SZ 7.5, PF

## (undated) DEVICE — IRRIGATOR, WOUND, HYDRO SURG PLUS, W/O TIP, DISP

## (undated) DEVICE — DRESSING, MEPILEX BORDER, POST-OP AG, 4 X 12 IN

## (undated) DEVICE — GOWN, SURGICAL, SIRUS, NON REINFORCED, LARGE

## (undated) DEVICE — CATHETER, CHOLANGIOGRAM, 4.5 FR, 45 CM, 18 IN